# Patient Record
Sex: FEMALE | Race: BLACK OR AFRICAN AMERICAN | Employment: UNEMPLOYED | URBAN - METROPOLITAN AREA
[De-identification: names, ages, dates, MRNs, and addresses within clinical notes are randomized per-mention and may not be internally consistent; named-entity substitution may affect disease eponyms.]

---

## 2016-09-09 LAB
ANTIBODY SCREEN, EXTERNAL: NEGATIVE
CHLAMYDIA, EXTERNAL: NEGATIVE
HBSAG, EXTERNAL: NEGATIVE
HIV, EXTERNAL: NON REACTIVE
N. GONORRHEA, EXTERNAL: NEGATIVE
RPR, EXTERNAL: NON REACTIVE
RUBELLA, EXTERNAL: NORMAL
TYPE, ABO & RH, EXTERNAL: NORMAL

## 2017-04-03 LAB — GRBS, EXTERNAL: NEGATIVE

## 2017-04-15 ENCOUNTER — HOSPITAL ENCOUNTER (INPATIENT)
Age: 27
LOS: 3 days | Discharge: HOME | DRG: 560 | End: 2017-04-18
Attending: OBSTETRICS & GYNECOLOGY | Admitting: OBSTETRICS & GYNECOLOGY
Payer: MEDICAID

## 2017-04-15 PROBLEM — Z34.90 PREGNANCY: Status: ACTIVE | Noted: 2017-04-15

## 2017-04-15 LAB
BASOPHILS # BLD AUTO: 0 K/UL (ref 0–0.1)
BASOPHILS # BLD: 0 % (ref 0–1)
EOSINOPHIL # BLD: 0.2 K/UL (ref 0–0.4)
EOSINOPHIL NFR BLD: 2 % (ref 0–7)
ERYTHROCYTE [DISTWIDTH] IN BLOOD BY AUTOMATED COUNT: 14.6 % (ref 11.5–14.5)
HCT VFR BLD AUTO: 33.7 % (ref 35–47)
HGB BLD-MCNC: 11 G/DL (ref 11.5–16)
LYMPHOCYTES # BLD AUTO: 30 % (ref 12–49)
LYMPHOCYTES # BLD: 2.8 K/UL (ref 0.8–3.5)
MCH RBC QN AUTO: 26.6 PG (ref 26–34)
MCHC RBC AUTO-ENTMCNC: 32.6 G/DL (ref 30–36.5)
MCV RBC AUTO: 81.6 FL (ref 80–99)
MONOCYTES # BLD: 1 K/UL (ref 0–1)
MONOCYTES NFR BLD AUTO: 11 % (ref 5–13)
NEUTS SEG # BLD: 5.4 K/UL (ref 1.8–8)
NEUTS SEG NFR BLD AUTO: 57 % (ref 32–75)
PLATELET # BLD AUTO: 207 K/UL (ref 150–400)
RBC # BLD AUTO: 4.13 M/UL (ref 3.8–5.2)
WBC # BLD AUTO: 9.4 K/UL (ref 3.6–11)

## 2017-04-15 PROCEDURE — 65410000002 HC RM PRIVATE OB

## 2017-04-15 PROCEDURE — 74011250636 HC RX REV CODE- 250/636: Performed by: OBSTETRICS & GYNECOLOGY

## 2017-04-15 PROCEDURE — 75410000002 HC LABOR FEE PER 1 HR

## 2017-04-15 PROCEDURE — 36415 COLL VENOUS BLD VENIPUNCTURE: CPT | Performed by: OBSTETRICS & GYNECOLOGY

## 2017-04-15 PROCEDURE — 85025 COMPLETE CBC W/AUTO DIFF WBC: CPT | Performed by: OBSTETRICS & GYNECOLOGY

## 2017-04-15 RX ORDER — ONDANSETRON 2 MG/ML
4 INJECTION INTRAMUSCULAR; INTRAVENOUS
Status: DISCONTINUED | OUTPATIENT
Start: 2017-04-15 | End: 2017-04-16

## 2017-04-15 RX ORDER — NALOXONE HYDROCHLORIDE 0.4 MG/ML
0.4 INJECTION, SOLUTION INTRAMUSCULAR; INTRAVENOUS; SUBCUTANEOUS AS NEEDED
Status: DISCONTINUED | OUTPATIENT
Start: 2017-04-15 | End: 2017-04-16

## 2017-04-15 RX ORDER — OXYTOCIN IN 5 % DEXTROSE 30/500 ML
1-25 PLASTIC BAG, INJECTION (ML) INTRAVENOUS AS NEEDED
Status: DISCONTINUED | OUTPATIENT
Start: 2017-04-15 | End: 2017-04-16

## 2017-04-15 RX ORDER — SODIUM CHLORIDE, SODIUM LACTATE, POTASSIUM CHLORIDE, CALCIUM CHLORIDE 600; 310; 30; 20 MG/100ML; MG/100ML; MG/100ML; MG/100ML
125 INJECTION, SOLUTION INTRAVENOUS CONTINUOUS
Status: DISCONTINUED | OUTPATIENT
Start: 2017-04-16 | End: 2017-04-16

## 2017-04-15 RX ORDER — NALBUPHINE HYDROCHLORIDE 10 MG/ML
10 INJECTION, SOLUTION INTRAMUSCULAR; INTRAVENOUS; SUBCUTANEOUS
Status: DISCONTINUED | OUTPATIENT
Start: 2017-04-15 | End: 2017-04-16

## 2017-04-15 RX ORDER — SODIUM CHLORIDE 0.9 % (FLUSH) 0.9 %
5-10 SYRINGE (ML) INJECTION EVERY 8 HOURS
Status: DISCONTINUED | OUTPATIENT
Start: 2017-04-16 | End: 2017-04-16

## 2017-04-15 RX ORDER — SODIUM CHLORIDE 0.9 % (FLUSH) 0.9 %
5-10 SYRINGE (ML) INJECTION AS NEEDED
Status: DISCONTINUED | OUTPATIENT
Start: 2017-04-15 | End: 2017-04-16

## 2017-04-15 RX ADMIN — SODIUM CHLORIDE, SODIUM LACTATE, POTASSIUM CHLORIDE, AND CALCIUM CHLORIDE 125 ML/HR: 600; 310; 30; 20 INJECTION, SOLUTION INTRAVENOUS at 23:23

## 2017-04-15 NOTE — IP AVS SNAPSHOT
Current Discharge Medication List  
  
START taking these medications Dose & Instructions Dispensing Information Comments Morning Noon Evening Bedtime HYDROcodone-acetaminophen 5-325 mg per tablet Commonly known as:  [place] Littler Your last dose was: Your next dose is:    
   
   
 Dose:  1 Tab Take 1 Tab by mouth every four (4) hours as needed. Max Daily Amount: 6 Tabs. Quantity:  20 Tab Refills:  0  
     
   
   
   
  
 ibuprofen 800 mg tablet Commonly known as:  MOTRIN Your last dose was: Your next dose is:    
   
   
 Dose:  800 mg Take 1 Tab by mouth every eight (8) hours as needed for Pain. Quantity:  30 Tab Refills:  0 ASK your doctor about these medications Dose & Instructions Dispensing Information Comments Morning Noon Evening Bedtime  
 cyclobenzaprine 5 mg tablet Commonly known as:  FLEXERIL Your last dose was: Your next dose is:    
   
   
 Dose:  5 mg Take 1 Tab by mouth three (3) times daily as needed for Muscle Spasm(s). Quantity:  20 Tab Refills:  0  
     
   
   
   
  
 PNV No12-Iron-FA-DSS-OM-3 29 mg iron-1 mg -50 mg Cpkd Your last dose was: Your next dose is: Take  by mouth. Indications: Pregnancy Refills:  0  
     
   
   
   
  
 traMADol 50 mg tablet Commonly known as:  ULTRAM  
   
Your last dose was: Your next dose is:    
   
   
 Dose:  50 mg Take 1 Tab by mouth every six (6) hours as needed for Pain. Quantity:  15 Tab Refills:  0 Where to Get Your Medications Information on where to get these meds will be given to you by the nurse or doctor. ! Ask your nurse or doctor about these medications HYDROcodone-acetaminophen 5-325 mg per tablet  
 ibuprofen 800 mg tablet

## 2017-04-15 NOTE — IP AVS SNAPSHOT
[address] [facility] 
[phone] Patient: Kalpesh García MRN: [id number] You are allergic to the following Allergen Reactions Prednisone Other (comments) \"extreme anxiety\" Immunizations Administered for This Admission Name Date Tdap 4/17/2017 Recent Documentation Height Weight Breastfeeding? BMI OB Status Smoking Status 1.651 m 88.5 kg Unknown 32.45 kg/m2 Recent pregnancy Never Smoker Unresulted Labs Order Current Status SAMPLE TO BLOOD BANK In process Emergency Contacts Name Discharge Info Relation Home Work Mobile Marlo Alejandro CAREGIVER [3] Parent [1]   [phone] About your hospitalization You were admitted on:  April 15, 2017 You last received care in the:  [facility] You were discharged on:  April 18, 2017 Unit phone number:  [phone] Why you were hospitalized Your primary diagnosis was:  Not on File Your diagnoses also included:  Pregnancy Providers Seen During Your Hospitalizations Provider Role Specialty Primary office phone Tatiana Sagastume MD Attending Provider Obstetrics & Gynecology [phone] Your Primary Care Physician (PCP) Primary Care Physician Office Phone Office Fax Tamaragreg Les [phone] Follow-up Information Follow up With Details Comments Contact Info Jovana Roman MD   [address] [place] [address] [address] [place] [address] 
[phone] Current Discharge Medication List  
  
START taking these medications Dose & Instructions Dispensing Information Comments Morning Noon Evening Bedtime HYDROcodone-acetaminophen 5-325 mg per tablet Commonly known as:  Duane Lisandro Your last dose was: Your next dose is:    
   
   
 Dose:  1 Tab Take 1 Tab by mouth every four (4) hours as needed. Max Daily Amount: 6 Tabs. Quantity:  20 Tab Refills:  0  
     
   
   
   
  
 ibuprofen 800 mg tablet Commonly known as:  MOTRIN Your last dose was: Your next dose is:    
   
   
 Dose:  800 mg Take 1 Tab by mouth every eight (8) hours as needed for Pain. Quantity:  30 Tab Refills:  0 ASK your doctor about these medications Dose & Instructions Dispensing Information Comments Morning Noon Evening Bedtime  
 cyclobenzaprine 5 mg tablet Commonly known as:  FLEXERIL Your last dose was: Your next dose is:    
   
   
 Dose:  5 mg Take 1 Tab by mouth three (3) times daily as needed for Muscle Spasm(s). Quantity:  20 Tab Refills:  0  
     
   
   
   
  
 PNV No12-Iron-FA-DSS-OM-3 29 mg iron-1 mg -50 mg Cpkd Your last dose was: Your next dose is: Take  by mouth. Indications: Pregnancy Refills:  0  
     
   
   
   
  
 traMADol 50 mg tablet Commonly known as:  ULTRAM  
   
Your last dose was: Your next dose is:    
   
   
 Dose:  50 mg Take 1 Tab by mouth every six (6) hours as needed for Pain. Quantity:  15 Tab Refills:  0 Where to Get Your Medications Information on where to get these meds will be given to you by the nurse or doctor. ! Ask your nurse or doctor about these medications HYDROcodone-acetaminophen 5-325 mg per tablet  
 ibuprofen 800 mg tablet Discharge Instructions After Your Delivery (the Postpartum Period): Care Instructions Your Care Instructions Congratulations on the birth of your baby. Like pregnancy, the  period can be a time of excitement, elieser, and exhaustion. You may look at your wondrous little baby and feel happy. You may also be overwhelmed by your new sleep hours and new responsibilities. At first, babies often sleep during the days and are awake at night. They do not have a pattern or routine. They may make sudden gasps, jerk themselves awake, or look like they have crossed eyes. These are all normal, and they may even make you smile. In these first weeks after delivery, try to take good care of yourself. It may take 4 to 6 weeks to feel like yourself again, and possibly longer if you had a  birth. You will likely feel very tired for several weeks. Your days will be full of ups and downs, but lots of elieser as well. Follow-up care is a key part of your treatment and safety. Be sure to make and go to all appointments, and call your doctor if you are having problems. It's also a good idea to know your test results and keep a list of the medicines you take. How can you care for yourself at home? Take care of your body after delivery · Use pads instead of tampons for the bloody flow that may last as long as 2 weeks. · Ease cramps with ibuprofen (Advil, Motrin). · Ease soreness of hemorrhoids and the area between your vagina and rectum with ice compresses or witch hazel pads. · Ease constipation by drinking lots of fluid and eating high-fiber foods. Ask your doctor about over-the-counter stool softeners. · Cleanse yourself with a gentle squeeze of warm water from a bottle instead of wiping with toilet paper. · Take a sitz bath in warm water several times a day. · Wear a good nursing bra. Ease sore and swollen breasts with warm, wet washcloths. · If you are not breastfeeding, use ice rather than heat for breast soreness. · Your period may not start for several months if you are breastfeeding. You may bleed more, and longer at first, than you did before you got pregnant. · Wait until you are healed (about 4 to 6 weeks) before you have sexual intercourse. Your doctor will tell you when it is okay to have sex. · Try not to travel with your baby for 5 or 6 weeks.  If you take a long car trip, make frequent stops to walk around and stretch. Avoid exhaustion · Rest every day. Try to nap when your baby naps. · Ask another adult to be with you for a few days after delivery. · Plan for  if you have other children. · Stay flexible so you can eat at odd hours and sleep when you need to. Both you and your baby are making new schedules. · Plan small trips to get out of the house. Change can make you feel less tired. · Ask for help with housework, cooking, and shopping. Remind yourself that your job is to care for your baby. Know about help for postpartum depression · \"Baby blues\" are common for the first 1 to 2 weeks after birth. You may cry or feel sad or irritable for no reason. · Rest whenever you can. Being tired makes it harder to handle your emotions. · Go for walks with your baby. · Talk to your partner, friends, and family about your feelings. · If your symptoms last for more than a few weeks, or if you feel very depressed, ask your doctor for help. · Postpartum depression can be treated. Support groups and counseling can help. Sometimes medicine can also help. Stay healthy · Eat healthy foods so you have more energy, make good breast milk, and lose extra baby pounds. · If you breastfeed, avoid alcohol and drugs. Stay smoke-free. If you quit during pregnancy, congratulations. · Start daily exercise after 4 to 6 weeks, but rest when you feel tired. · Learn exercises to tone your belly. Do Kegel exercises to regain strength in your pelvic muscles. You can do these exercises while you stand or sit. ¨ Squeeze the same muscles you would use to stop your urine. Your belly and thighs should not move. ¨ Hold the squeeze for 3 seconds, and then relax for 3 seconds. ¨ Start with 3 seconds. Then add 1 second each week until you are able to squeeze for 10 seconds. ¨ Repeat the exercise 10 to 15 times for each session. Do three or more sessions each day. · Find a class for new mothers and new babies that has an exercise time. · If you had a  birth, give yourself a bit more time before you exercise, and be careful. When should you call for help? Call 911 anytime you think you may need emergency care. For example, call if: 
· You passed out (lost consciousness). Call your doctor now or seek immediate medical care if: 
· You have severe vaginal bleeding. This means you are passing blood clots and soaking through a pad each hour for 2 or more hours. · You are dizzy or lightheaded, or you feel like you may faint. · You have a fever. · You have new belly pain, or your pain gets worse. Watch closely for changes in your health, and be sure to contact your doctor if: 
· Your vaginal bleeding seems to be getting heavier. · You have new or worse vaginal discharge. · You feel sad, anxious, or hopeless for more than a few days. · You do not get better as expected. Where can you learn more? Go to http://diane-meagan.info/. Enter A461 in the search box to learn more about \"After Your Delivery (the Postpartum Period): Care Instructions. \" Current as of: May 30, 2016 Content Version: 11.2 © 1095-4014 [employer]. Care instructions adapted under license by [employer] (which disclaims liability or warranty for this information). If you have questions about a medical condition or this instruction, always ask your healthcare professional. [place] [address] any warranty or liability for your use of this information. [employer] Activation Thank you for requesting access to [employer]. Please follow the instructions below to securely access and download your online medical record. [employer] allows you to send messages to your doctor, view your test results, renew your prescriptions, schedule appointments, and more. How Do I Sign Up? 1. In your internet browser, go to [email or web address] 
 2. Click on the First Time User? Click Here link in the Sign In box. You will be redirect to the New Member Sign Up page. 3. Enter your [employer] Access Code exactly as it appears below. You will not need to use this code after youve completed the sign-up process. If you do not sign up before the expiration date, you must request a new code. [employer] Access Code: [email or web address] Expires: 2017  9:10 AM (This is the date your [employer] access code will ) 4. Enter the last four digits of your Social Security Number (xxxx) and Date of Birth (mm/dd/yyyy) as indicated and click Submit. You will be taken to the next sign-up page. 5. Create a [employer] ID. This will be your [employer] login ID and cannot be changed, so think of one that is secure and easy to remember. 6. Create a [employer] password. You can change your password at any time. 7. Enter your Password Reset Question and Answer. This can be used at a later time if you forget your password. 8. Enter your e-mail address. You will receive e-mail notification when new information is available in [address] 9. Click Sign Up. You can now view and download portions of your medical record. 10. Click the Download Summary menu link to download a portable copy of your medical information. Additional Information If you have questions, please visit the Frequently Asked Questions section of the [employer] website at [email or web address] Remember, [employer] is NOT to be used for urgent needs. For medical emergencies, dial 911. Discharge Orders None Introducing [facility]! Kaitlin Moreno introduces [employer] patient portal. Now you can access parts of your medical record, email your doctor's office, and request medication refills online. 1. In your internet browser, go to [email or web address] 2. Click on the First Time User? Click Here link in the Sign In box. You will see the New Member Sign Up page. 3. Enter your [employer] Access Code exactly as it appears below. You will not need to use this code after youve completed the sign-up process. If you do not sign up before the expiration date, you must request a new code. · [employer] Access Code: [email or web address] Expires: 7/17/2017  9:10 AM 
 
4. Enter the last four digits of your Social Security Number (xxxx) and Date of Birth (mm/dd/yyyy) as indicated and click Submit. You will be taken to the next sign-up page. 5. Create a [employer] ID. This will be your [employer] login ID and cannot be changed, so think of one that is secure and easy to remember. 6. Create a [employer] password. You can change your password at any time. 7. Enter your Password Reset Question and Answer. This can be used at a later time if you forget your password. 8. Enter your e-mail address. You will receive e-mail notification when new information is available in [address] 9. Click Sign Up. You can now view and download portions of your medical record. 10. Click the Download Summary menu link to download a portable copy of your medical information. If you have questions, please visit the Frequently Asked Questions section of the [employer] website. Remember, [employer] is NOT to be used for urgent needs. For medical emergencies, dial 911. Now available from your iPhone and Android! General Information Please provide this summary of care documentation to your next provider. Patient Signature:  ____________________________________________________________ Date:  ____________________________________________________________  
  
Sheri Cabral Provider Signature:  ____________________________________________________________ Date:  ____________________________________________________________

## 2017-04-15 NOTE — IP AVS SNAPSHOT
Summary of Care Report The Summary of Care report has been created to help improve care coordination. Users with access to [employer] or [address] (Web-based application) may access additional patient information including the Discharge Summary. If you are not currently a [employer] user and need more information, please call the number listed below in the [place] [address] section and ask to be connected with Medical Records. Facility Information Name Address Phone [address] [address] [phone] Patient Information Patient Name Sex  Maki Hogue ([id number]) Female 1990 Discharge Information Admitting Provider Service Area Unit Melody Navarro MD / [phone] [employer] [facility] [facility] Mother Infant / [phone] Discharge Provider Discharge Date/Time Discharge Disposition Destination (none) 2017 Morning (Pending) AHR (none) Patient Language Language ENGLISH [13] Hospital Problems as of 2017  Reviewed: 2012  2:32 PM by Kenya Aaron Noted - Resolved Last Modified POA Active Problems Pregnancy  4/15/2017 - Present 4/15/2017 by Melody Navarro MD Unknown Entered by Melody Navarro MD  
  
Non-Hospital Problems as of 2017  Reviewed: 2012  2:32 PM by Kenya Aaron Noted - Resolved Last Modified Active Problems PE (pulmonary embolism)  2012 - Present 2012 Entered by Ambrocio Barber MD  
  CHI (closed head injury)  2012 - Present 2012 Entered by Kenya Jenkins Elevated INR  2012 - Present 2012 Entered by Kenya Jenkins You are allergic to the following Allergen Reactions Prednisone Other (comments) \"extreme anxiety\" Current Discharge Medication List  
  
 START taking these medications Dose & Instructions Dispensing Information Comments HYDROcodone-acetaminophen 5-325 mg per tablet Commonly known as:  Cindy Mandril Dose:  1 Tab Take 1 Tab by mouth every four (4) hours as needed. Max Daily Amount: 6 Tabs. Quantity:  20 Tab Refills:  0  
   
 ibuprofen 800 mg tablet Commonly known as:  MOTRIN Dose:  800 mg Take 1 Tab by mouth every eight (8) hours as needed for Pain. Quantity:  30 Tab Refills:  0 ASK your doctor about these medications Dose & Instructions Dispensing Information Comments  
 cyclobenzaprine 5 mg tablet Commonly known as:  FLEXERIL Dose:  5 mg Take 1 Tab by mouth three (3) times daily as needed for Muscle Spasm(s). Quantity:  20 Tab Refills:  0  
   
 PNV No12-Iron-FA-DSS-OM-3 29 mg iron-1 mg -50 mg Cpkd Take  by mouth. Indications: Pregnancy Refills:  0  
   
 traMADol 50 mg tablet Commonly known as:  ULTRAM  
 Dose:  50 mg Take 1 Tab by mouth every six (6) hours as needed for Pain. Quantity:  15 Tab Refills:  0 Current Immunizations Name Date Influenza Vaccine Split  Deferred (Patient Refused) Tdap 2017 Follow-up Information Follow up With Details Comments Contact Info Javier Vences MD   [address] [place] [address] [address] [place] [address] 
[phone] Discharge Instructions After Your Delivery (the Postpartum Period): Care Instructions Your Care Instructions Congratulations on the birth of your baby. Like pregnancy, the  period can be a time of excitement, elieser, and exhaustion. You may look at your wondrous little baby and feel happy. You may also be overwhelmed by your new sleep hours and new responsibilities. At first, babies often sleep during the days and are awake at night. They do not have a pattern or routine.  They may make sudden gasps, jerk themselves awake, or look like they have crossed eyes. These are all normal, and they may even make you smile. In these first weeks after delivery, try to take good care of yourself. It may take 4 to 6 weeks to feel like yourself again, and possibly longer if you had a  birth. You will likely feel very tired for several weeks. Your days will be full of ups and downs, but lots of elieser as well. Follow-up care is a key part of your treatment and safety. Be sure to make and go to all appointments, and call your doctor if you are having problems. It's also a good idea to know your test results and keep a list of the medicines you take. How can you care for yourself at home? Take care of your body after delivery · Use pads instead of tampons for the bloody flow that may last as long as 2 weeks. · Ease cramps with ibuprofen (Advil, Motrin). · Ease soreness of hemorrhoids and the area between your vagina and rectum with ice compresses or witch hazel pads. · Ease constipation by drinking lots of fluid and eating high-fiber foods. Ask your doctor about over-the-counter stool softeners. · Cleanse yourself with a gentle squeeze of warm water from a bottle instead of wiping with toilet paper. · Take a sitz bath in warm water several times a day. · Wear a good nursing bra. Ease sore and swollen breasts with warm, wet washcloths. · If you are not breastfeeding, use ice rather than heat for breast soreness. · Your period may not start for several months if you are breastfeeding. You may bleed more, and longer at first, than you did before you got pregnant. · Wait until you are healed (about 4 to 6 weeks) before you have sexual intercourse. Your doctor will tell you when it is okay to have sex. · Try not to travel with your baby for 5 or 6 weeks. If you take a long car trip, make frequent stops to walk around and stretch. Avoid exhaustion · Rest every day. Try to nap when your baby naps. · Ask another adult to be with you for a few days after delivery. · Plan for  if you have other children. · Stay flexible so you can eat at odd hours and sleep when you need to. Both you and your baby are making new schedules. · Plan small trips to get out of the house. Change can make you feel less tired. · Ask for help with housework, cooking, and shopping. Remind yourself that your job is to care for your baby. Know about help for postpartum depression · \"Baby blues\" are common for the first 1 to 2 weeks after birth. You may cry or feel sad or irritable for no reason. · Rest whenever you can. Being tired makes it harder to handle your emotions. · Go for walks with your baby. · Talk to your partner, friends, and family about your feelings. · If your symptoms last for more than a few weeks, or if you feel very depressed, ask your doctor for help. · Postpartum depression can be treated. Support groups and counseling can help. Sometimes medicine can also help. Stay healthy · Eat healthy foods so you have more energy, make good breast milk, and lose extra baby pounds. · If you breastfeed, avoid alcohol and drugs. Stay smoke-free. If you quit during pregnancy, congratulations. · Start daily exercise after 4 to 6 weeks, but rest when you feel tired. · Learn exercises to tone your belly. Do Kegel exercises to regain strength in your pelvic muscles. You can do these exercises while you stand or sit. ¨ Squeeze the same muscles you would use to stop your urine. Your belly and thighs should not move. ¨ Hold the squeeze for 3 seconds, and then relax for 3 seconds. ¨ Start with 3 seconds. Then add 1 second each week until you are able to squeeze for 10 seconds. ¨ Repeat the exercise 10 to 15 times for each session. Do three or more sessions each day. · Find a class for new mothers and new babies that has an exercise time.  
· If you had a  birth, give yourself a bit more time before you exercise, and be careful. When should you call for help? Call 911 anytime you think you may need emergency care. For example, call if: 
· You passed out (lost consciousness). Call your doctor now or seek immediate medical care if: 
· You have severe vaginal bleeding. This means you are passing blood clots and soaking through a pad each hour for 2 or more hours. · You are dizzy or lightheaded, or you feel like you may faint. · You have a fever. · You have new belly pain, or your pain gets worse. Watch closely for changes in your health, and be sure to contact your doctor if: 
· Your vaginal bleeding seems to be getting heavier. · You have new or worse vaginal discharge. · You feel sad, anxious, or hopeless for more than a few days. · You do not get better as expected. Where can you learn more? Go to http://diane-meagan.info/. Enter A461 in the search box to learn more about \"After Your Delivery (the Postpartum Period): Care Instructions. \" Current as of: May 30, 2016 Content Version: 11.2 © 9469-1338 [employer]. Care instructions adapted under license by [employer] (which disclaims liability or warranty for this information). If you have questions about a medical condition or this instruction, always ask your healthcare professional. [address] any warranty or liability for your use of this information. [employer] Activation Thank you for requesting access to [employer]. Please follow the instructions below to securely access and download your online medical record. [employer] allows you to send messages to your doctor, view your test results, renew your prescriptions, schedule appointments, and more. How Do I Sign Up? 1. In your internet browser, go to [email or web address].[email or web address] 
2. Click on the First Time User? Click Here link in the Sign In box. You will be redirect to the New Member Sign Up page. 3. Enter your [employer] Access Code exactly as it appears below. You will not need to use this code after youve completed the sign-up process. If you do not sign up before the expiration date, you must request a new code. [employer] Access Code: [email or web address] Expires: 2017  9:10 AM (This is the date your [employer] access code will ) 4. Enter the last four digits of your Social Security Number (xxxx) and Date of Birth (mm/dd/yyyy) as indicated and click Submit. You will be taken to the next sign-up page. 5. Create a [employer] ID. This will be your [employer] login ID and cannot be changed, so think of one that is secure and easy to remember. 6. Create a [employer] password. You can change your password at any time. 7. Enter your Password Reset Question and Answer. This can be used at a later time if you forget your password. 8. Enter your e-mail address. You will receive e-mail notification when new information is available in [address] 9. Click Sign Up. You can now view and download portions of your medical record. 10. Click the Download Summary menu link to download a portable copy of your medical information. Additional Information If you have questions, please visit the Frequently Asked Questions section of the [employer] website at [email or web address] Remember, [employer] is NOT to be used for urgent needs. For medical emergencies, dial 911. Chart Review Routing History Recipient Method Report Sent By Hedy Paz MD  
Fax: [phone] Phone: [phone] Fax Manjinder Alba MD NOTES AUTO ROUTING REPORT Lisette Mclaughlin MD [[id number]] 2017  6:53 AM 2017

## 2017-04-16 PROCEDURE — 77030021125

## 2017-04-16 PROCEDURE — 99281 EMR DPT VST MAYX REQ PHY/QHP: CPT

## 2017-04-16 PROCEDURE — 65410000002 HC RM PRIVATE OB

## 2017-04-16 PROCEDURE — 75410000000 HC DELIVERY VAGINAL/SINGLE

## 2017-04-16 PROCEDURE — 74011250636 HC RX REV CODE- 250/636: Performed by: OBSTETRICS & GYNECOLOGY

## 2017-04-16 PROCEDURE — 75410000003 HC RECOV DEL/VAG/CSECN EA 0.5 HR

## 2017-04-16 PROCEDURE — 74011250637 HC RX REV CODE- 250/637: Performed by: OBSTETRICS & GYNECOLOGY

## 2017-04-16 PROCEDURE — 75410000002 HC LABOR FEE PER 1 HR

## 2017-04-16 RX ORDER — HYDROCODONE BITARTRATE AND ACETAMINOPHEN 5; 325 MG/1; MG/1
1 TABLET ORAL
Status: DISCONTINUED | OUTPATIENT
Start: 2017-04-16 | End: 2017-04-18 | Stop reason: HOSPADM

## 2017-04-16 RX ORDER — NALOXONE HYDROCHLORIDE 0.4 MG/ML
0.4 INJECTION, SOLUTION INTRAMUSCULAR; INTRAVENOUS; SUBCUTANEOUS AS NEEDED
Status: DISCONTINUED | OUTPATIENT
Start: 2017-04-16 | End: 2017-04-18 | Stop reason: HOSPADM

## 2017-04-16 RX ORDER — OXYTOCIN/RINGER'S LACTATE 20/1000 ML
125-500 PLASTIC BAG, INJECTION (ML) INTRAVENOUS ONCE
Status: ACTIVE | OUTPATIENT
Start: 2017-04-16 | End: 2017-04-17

## 2017-04-16 RX ORDER — IBUPROFEN 400 MG/1
800 TABLET ORAL EVERY 8 HOURS
Status: DISCONTINUED | OUTPATIENT
Start: 2017-04-16 | End: 2017-04-18 | Stop reason: HOSPADM

## 2017-04-16 RX ADMIN — Medication 16 MILLI-UNITS/MIN: at 10:00

## 2017-04-16 RX ADMIN — Medication 10 MILLI-UNITS/MIN: at 08:30

## 2017-04-16 RX ADMIN — SODIUM CHLORIDE, SODIUM LACTATE, POTASSIUM CHLORIDE, AND CALCIUM CHLORIDE 125 ML/HR: 600; 310; 30; 20 INJECTION, SOLUTION INTRAVENOUS at 09:04

## 2017-04-16 RX ADMIN — Medication 14 MILLI-UNITS/MIN: at 09:27

## 2017-04-16 RX ADMIN — Medication 6 MILLI-UNITS/MIN: at 07:05

## 2017-04-16 RX ADMIN — Medication 4 MILLI-UNITS/MIN: at 06:36

## 2017-04-16 RX ADMIN — Medication 18 MILLI-UNITS/MIN: at 10:30

## 2017-04-16 RX ADMIN — Medication 20 MILLI-UNITS/MIN: at 11:00

## 2017-04-16 RX ADMIN — Medication 8 MILLI-UNITS/MIN: at 08:00

## 2017-04-16 RX ADMIN — Medication 12 MILLI-UNITS/MIN: at 09:00

## 2017-04-16 RX ADMIN — Medication 1 MILLI-UNITS/MIN: at 06:01

## 2017-04-16 RX ADMIN — IBUPROFEN 800 MG: 400 TABLET, FILM COATED ORAL at 17:37

## 2017-04-16 NOTE — PROGRESS NOTES
Received this 27 y.o  at 40 2/7 weeks gestation . Pt presents with c/o leaking fluid since 10 pm states + fetal movement. Followed prenatally by Dr. Hermann Atkinson. Pt has hx of  x1 . Placed on EFM x2 . Consents obtained . Dr. Moise Esteban to be  notified pt is here and orders pending . 2220 clear fluid noted on pad . + Nitrazine .  Pt transferred to 3161    2310 Dr. Moise Esteban at beside e done cervix 2cm/    2320 Vertex confirmed by U/S by Dr. Moise Esteban

## 2017-04-16 NOTE — PROGRESS NOTES
TRANSFER - OUT REPORT:    Verbal report given to MARGARITA Schaefer RN(name) on Devon Dutta  being transferred to MIU(unit) for routine progression of care       Report consisted of patients Situation, Background, Assessment and   Recommendations(SBAR). Information from the following report(s) SBAR, Procedure Summary, Intake/Output, MAR and Recent Results was reviewed with the receiving nurse. Opportunity for questions and clarification was provided.

## 2017-04-16 NOTE — PROGRESS NOTES
Labor Progress Note  Patient seen, fetal heart rate and contraction pattern evaluated, patient examined. Wants to push.   Patient Vitals for the past 1 hrs:   BP Pulse SpO2   04/16/17 1410 116/70 76 -   04/16/17 1409 - - 99 %       Physical Exam:  Cervical Exam:  8 cm dilated    100% effaced    +1 station    Presenting Part: cephalic  Membranes:  clear fluid  Uterine Activity: Frequency: Every 3 minutes  Fetal Heart Rate: Variability: moderate  Accelerations: yes  Decelerations: variable    Assessment/Plan:  Reassuring fetal status, Continue plan for vaginal delivery

## 2017-04-16 NOTE — PROGRESS NOTES
Labor Progress Note  Patient seen, fetal heart rate and contraction pattern evaluated. Feeling some increasing cramping. Leaking clear fluid  Physical Exam:  116/69  Cervical Exam: not checked  Membranes:  ruptured  Uterine Activity: Frequency: q5 minutes  Fetal Heart Rate: 130 - 140,  adequate variability and reactivity  Accelerations: yes  Decelerations: none    Assessment/Plan:  Reassuring fetal status. Inadequate contraction pattern  Start augmentation--discussed with patient--she states she understands and agrees.   Regina Cornell MD

## 2017-04-16 NOTE — PROGRESS NOTES
Labor Progress Note  Patient seen, fetal heart rate and contraction pattern evaluated, patient examined. No data found.       Physical Exam:  Cervical Exam:  5 cm dilated    90% effaced    0 station    Presenting Part: cephalic  Cervical Position: anterior  Membranes:  clear fluid  Uterine Activity: Frequency: Every 3 minutes  Fetal Heart Rate: Reactive    Assessment/Plan:  Reassuring fetal status, Continue plan for vaginal delivery, SCD if in bed

## 2017-04-16 NOTE — H&P
OB History & Physical    Name: Jeanine Duarte MRN: [id number]  SSN: [id number]    YOB: 1990  Age: 27 y.o. Sex: female      Subjective:     Reason for Admission:  Pregnancy and PROM    History of Present Illness: Jeanine Duarte is a 27 y.o.  female with an estimated gestational age of 42w2d with Estimated Date of Delivery: 17. Patient complains of PROM clear fluid this evening at home about 2130. No contractions, bleeding. Baby active. She states she was treated for shingles earlier in the pregnancy--has completed her antiviral therapy, no more symptoms or lesions. Also has a history of PE due to hormonal contraception. Was on anti-coagulation for 6 months, then discontinued. No Rx during pregnancy. OB History      Para Term  AB TAB SAB Ectopic Multiple Living    2 1        1        Past Medical History:   Diagnosis Date    Other ill-defined conditions     pulmonary embolus    Other unknown and unspecified cause of morbidity or mortality     Shingles 2017    Pulmonary embolism ([facility] [address]      History reviewed. No pertinent surgical history. Social History     Occupational History    Not on file. Social History Main Topics    Smoking status: Never Smoker    Smokeless tobacco: Not on file    Alcohol use No    Drug use: No    Sexual activity: No     History reviewed. No pertinent family history. Allergies   Allergen Reactions    Prednisone Other (comments)     \"extreme anxiety\"     Prior to Admission medications    Medication Sig Start Date End Date Taking? Authorizing Provider   PNV No12-Iron-FA-DSS-OM-3 29 mg iron-1 mg -50 mg CPKD Take  by mouth. Indications: Pregnancy   Yes Historical Provider   cyclobenzaprine (FLEXERIL) 5 mg tablet Take 1 Tab by mouth three (3) times daily as needed for Muscle Spasm(s). 13   Noretta Inocencio, DO   traMADol (ULTRAM) 50 mg tablet Take 1 Tab by mouth every six (6) hours as needed for Pain.  13   Noretta Inocencio, DO Review of Systems   General: Denies fever, sweats, chills  CV: Denies CP, palpitations  Resp: Denies SOB, cough  GI: Denies nausea, vomiting, diarrhea  : Denies dysura, abnormal frequency, hematuria  Neuro: Denies HA, visual disturbance    Objective:     Vitals:    Vitals:    17 0609 17 0616 17 0636 17 0637   BP: 112/72   116/69   Pulse: 76   80   Resp:    (P) 16   Temp:    (P) 98.4 °F (36.9 °C)   SpO2:  100% 100% (P) 100%   Weight:       Height:          Temp (24hrs), Av.4 °F (36.9 °C), Min:98 °F (36.7 °C), Max:98.6 °F (37 °C)    BP  Min: 102/66  Max: 123/84     Physical Exam  General: in NAD  Back: no CVAT  Abdomen: Gravid, soft, nontender, no abnormal masses, rebound, rigidity, guarding  Fundus: soft, nontender  Cervical Exam: 2/70/-2/posterior  Uterine Activity: irregular  Membranes: gross ROM clear fluid  Fetal Heart Rate: adequate variability and reactivity    Vertex presentation confirmed by ultrasound    Labs:   Recent Results (from the past 24 hour(s))   CBC WITH AUTOMATED DIFF    Collection Time: 04/15/17 11:26 PM   Result Value Ref Range    WBC 9.4 3.6 - 11.0 K/uL    RBC 4.13 3.80 - 5.20 M/uL    HGB 11.0 (L) 11.5 - 16.0 g/dL    HCT 33.7 (L) 35.0 - 47.0 %    MCV 81.6 80.0 - 99.0 FL    MCH 26.6 26.0 - 34.0 PG    MCHC 32.6 30.0 - 36.5 g/dL    RDW 14.6 (H) 11.5 - 14.5 %    PLATELET 099 735 - 599 K/uL    NEUTROPHILS 57 32 - 75 %    LYMPHOCYTES 30 12 - 49 %    MONOCYTES 11 5 - 13 %    EOSINOPHILS 2 0 - 7 %    BASOPHILS 0 0 - 1 %    ABS. NEUTROPHILS 5.4 1.8 - 8.0 K/UL    ABS. LYMPHOCYTES 2.8 0.8 - 3.5 K/UL    ABS. MONOCYTES 1.0 0.0 - 1.0 K/UL    ABS. EOSINOPHILS 0.2 0.0 - 0.4 K/UL    ABS. BASOPHILS 0.0 0.0 - 0.1 K/UL       Patient Active Problem List   Diagnosis Code    PE (pulmonary embolism) I26.99    CHI (closed head injury) S09. 90XA    Elevated INR R79.1    Pregnancy Z33.1     Assessment and Plan:     PROM at 37 weeks with onset irregular contractions.   GBS negative  Will observe over the next few hours for increased contractions, augment if necessary.       Signed By:  Maria E Zimmer MD     April 16, 2017                 istor

## 2017-04-16 NOTE — L&D DELIVERY NOTE
Delivery Summary  Patient: Domingo Polk             Additional Delivery Comments - patient delivers a viable female infant via  over an intact perineum. No difficulty with delivery of shoulders requiring  maternal expulsive efforts only. No comps. Mother and baby doing well. Information for the patient's :  Bebeto Leos [[id number]]       Labor Events:    Labor: No   Rupture Date: 4/15/2017   Rupture Time: 10:00 PM   Rupture Type SROM   Amniotic Fluid Volume: Moderate    Amniotic Fluid Description: Clear     Induction: None       Augmentation: Oxytocin   Labor Events: None     Cervical Ripening:     None     Delivery Events:  Episiotomy: None   Laceration(s): None     Repaired: None    Number of Repair Packets:     Suture Type and Size: None     Estimated Blood Loss (ml): 200ml       Delivery Date: 2017    Delivery Time: 3:16 PM  Delivery Type: Vaginal, Spontaneous Delivery  Sex:  Female     Gestational Age: 37w3d   Delivery Clinician:  Marvin Quiñones  Living Status: Yes   Delivery Location: L&D            APGARS  One minute Five minutes Ten minutes   Skin color: 0   1        Heart rate: 2   2        Grimace: 2   2        Muscle tone: 2   2        Breathin   2        Totals: 8   9            Presentation: Vertex    Position:   Occiput Anterior  Resuscitation Method:  Suctioning-bulb; Tactile Stimulation     Meconium Stained: None      Cord Vessels: 3 Vessels      Cord Events:    Cord Blood Sent?:  No    Blood Gases Sent?:  Yes    Placenta:  Date/Time:   3:20 PM  Removal: Spontaneous      Appearance: Intact     [place] Measurements:  Birth Weight: 3.025 kg      Birth Length: 50.8 cm      Head Circumference: 33 cm      Chest Circumference: 32.5 cm     Abdominal Girth: 31 cm    Other Providers:   GENNY MARCANO;TONIA BRAY;AISHA DALEY;KADEEM GREY, Obstetrician;Primary Nurse;Primary [place] Nurse;Nursery Nurse           Cord pH:  South pH= 7.37, BE= -5.1 Art pH= 7.25,  BE= -6.4    Episiotomy: None   Laceration(s): None      Estimated Blood Loss (ml): 200    Labor Events  Method: None       Augmentation: Oxytocin   Cervical Ripening:       None        Operative Vaginal Delivery - none    Group B Strep:   Lab Results   Component Value Date/Time    GrBStrep, External NEGATIVE 2017     Information for the patient's :  Davon Norman [[id number]]   No results found for: ABORH, PCTABR, PCTDIG, BILI, ABORHEXT, ABORH    No results found for: APH, APCO2, APO2, AHCO3, ABEC, ABDC, O2ST, EPHV, PCO2V, PO2V, HCO3V, EBEV, EBDV, SITE, RSCOM

## 2017-04-16 NOTE — PROGRESS NOTES
Labor Progress Note  Patient seen, fetal heart rate and contraction pattern evaluated, patient examined.   Patient Vitals for the past 1 hrs:   BP Temp Pulse Resp   04/16/17 1307 109/67 - 90 -   04/16/17 1300 - 98.3 °F (36.8 °C) - 22       Physical Exam:  Cervical Exam:  5-6 cm dilated    100% effaced    0 station    Presenting Part: cephalic  Membranes:  clear fluid  Uterine Activity: Frequency: Every 3 minutes  Fetal Heart Rate: Reactive  Accelerations: yes  Decelerations: rare variable    Assessment/Plan:  Reassuring fetal status, Continue plan for vaginal delivery

## 2017-04-16 NOTE — PROGRESS NOTES
Labor Progress Note  Patient seen, fetal heart rate and contraction pattern evaluated, patient examined. No data found.       Physical Exam:  Cervical Exam:  6-7 cm dilated    100% effaced    0 station    Presenting Part: cephalic  Membranes:  clear fluid  Uterine Activity: Frequency: Every 3 minutes  Fetal Heart Rate: Accelerations: yes with scalp stim    Assessment/Plan:  Reassuring fetal status, Continue plan for vaginal delivery

## 2017-04-17 PROCEDURE — 74011250637 HC RX REV CODE- 250/637: Performed by: OBSTETRICS & GYNECOLOGY

## 2017-04-17 PROCEDURE — 90715 TDAP VACCINE 7 YRS/> IM: CPT | Performed by: OBSTETRICS & GYNECOLOGY

## 2017-04-17 PROCEDURE — 65410000002 HC RM PRIVATE OB

## 2017-04-17 PROCEDURE — 74011250636 HC RX REV CODE- 250/636: Performed by: OBSTETRICS & GYNECOLOGY

## 2017-04-17 RX ORDER — DOCUSATE SODIUM 100 MG/1
100 CAPSULE, LIQUID FILLED ORAL
Status: DISCONTINUED | OUTPATIENT
Start: 2017-04-17 | End: 2017-04-18 | Stop reason: HOSPADM

## 2017-04-17 RX ORDER — SIMETHICONE 80 MG
80 TABLET,CHEWABLE ORAL
Status: DISCONTINUED | OUTPATIENT
Start: 2017-04-17 | End: 2017-04-18 | Stop reason: HOSPADM

## 2017-04-17 RX ADMIN — IBUPROFEN 800 MG: 400 TABLET, FILM COATED ORAL at 15:58

## 2017-04-17 RX ADMIN — IBUPROFEN 800 MG: 400 TABLET, FILM COATED ORAL at 08:21

## 2017-04-17 RX ADMIN — DOCUSATE SODIUM 100 MG: 100 CAPSULE, LIQUID FILLED ORAL at 20:22

## 2017-04-17 RX ADMIN — TETANUS TOXOID, REDUCED DIPHTHERIA TOXOID AND ACELLULAR PERTUSSIS VACCINE, ADSORBED 0.5 ML: 5; 2.5; 8; 8; 2.5 SUSPENSION INTRAMUSCULAR at 08:22

## 2017-04-17 RX ADMIN — SIMETHICONE 80 MG: 80 TABLET, CHEWABLE ORAL at 20:23

## 2017-04-17 RX ADMIN — IBUPROFEN 800 MG: 400 TABLET, FILM COATED ORAL at 00:38

## 2017-04-17 NOTE — PROGRESS NOTES
Post-Partum Day Number 1 Progress Note    [facility]     Information for the patient's :  Pamela Cifuentes [[id number]]   Vaginal, Spontaneous Delivery   Patient doing well without significant complaint. Voiding without difficulty, normal lochia. Vitals:    Visit Vitals    BP 99/70 (BP 1 Location: Right arm, BP Patient Position: At rest)    Pulse 69    Temp 97.6 °F (36.4 °C)    Resp 18    Ht 5' 5\" (1.651 m)    Wt 88.5 kg (195 lb)    SpO2 93%    Breastfeeding Unknown    BMI 32.45 kg/m2     Temp (24hrs), Av.8 °F (36.6 °C), Min:97.6 °F (36.4 °C), Max:98.3 °F (36.8 °C)          Exam:  Patient without distress. Abdomen soft, fundus firm, nontender                Perineum with normal lochia noted. Lower extremities are negative for swelling, cords or tenderness.     Labs:     Lab Results   Component Value Date/Time    WBC 9.4 04/15/2017 11:26 PM    WBC 4.2 2013 10:41 AM    WBC 5.3 2012 08:56 PM    WBC 4.0 2012 10:20 AM    WBC 3.5 2012 10:25 AM    WBC 6.2 2012 05:45 PM    WBC 3.9 2012 08:27 AM    WBC 4.6 2012 01:40 PM    WBC 5.4 2012 04:45 PM    WBC 5.4 2012 07:06 PM    WBC 7.2 2012 04:15 PM    WBC 4.5 2012 01:15 PM    WBC 6.2 01/15/2012 02:01 AM    WBC 5.6 2011 12:45 PM    WBC 5.8 2011 05:30 PM    HGB 11.0 04/15/2017 11:26 PM    HGB 12.8 2013 10:41 AM    HGB 10.7 2012 08:56 PM    HGB 11.5 2012 10:20 AM    HGB 12.0 2012 10:25 AM    HGB 11.5 2012 05:45 PM    HGB 12.0 2012 08:27 AM    HGB 11.6 2012 01:40 PM    HGB 11.5 2012 04:45 PM    HGB 13.1 2012 07:06 PM    HGB 12.9 2012 04:15 PM    HGB 12.5 2012 01:15 PM    HGB 12.1 01/15/2012 02:01 AM    HGB 12.3 2011 12:45 PM    HGB 12.3 2011 05:30 PM    HCT 33.7 04/15/2017 11:26 PM    HCT 39.5 2013 10:41 AM    HCT 33.1 2012 08:56 PM    HCT 35.2 2012 10:20 AM HCT 36.1 05/02/2012 10:25 AM    HCT 35.0 04/23/2012 05:45 PM    HCT 35.7 04/20/2012 08:27 AM    HCT 35.8 03/18/2012 01:40 PM    HCT 34.4 03/16/2012 04:45 PM    HCT 39.6 02/23/2012 07:06 PM    HCT 38.5 02/16/2012 04:15 PM    HCT 38.3 02/09/2012 01:15 PM    HCT 36.3 01/15/2012 02:01 AM    HCT 37.2 12/17/2011 12:45 PM    HCT 37.1 12/11/2011 05:30 PM    PLATELET 921 74/47/0451 11:26 PM    PLATELET 079 57/91/2209 10:41 AM    PLATELET 580 83/77/5027 08:56 PM    PLATELET 144 17/68/4553 10:20 AM    PLATELET 061 11/75/4738 10:25 AM    PLATELET 662 81/78/3082 05:45 PM    PLATELET 500 95/59/7759 08:27 AM    PLATELET 162 89/97/6827 01:40 PM    PLATELET 461 51/22/5580 04:45 PM    PLATELET 596 87/17/1728 07:06 PM    PLATELET 137 35/45/9913 04:15 PM    PLATELET 315 34/52/8850 01:15 PM    PLATELET 061 16/99/5786 02:01 AM    PLATELET 308 43/59/2182 12:45 PM    PLATELET 715 53/01/5680 05:30 PM       No results found for this or any previous visit (from the past 24 hour(s)). Assessment: Doing well, post partum day 1      Plan:  1. Continue routine postpartum and perineal care as well as maternal education.

## 2017-04-17 NOTE — PROGRESS NOTES
Bedside and Verbal shift change report given to MARY Cuello (oncoming nurse) by Naomi Mccray RN (offgoing nurse). Report included the following information SBAR, Intake/Output and MAR.

## 2017-04-17 NOTE — ADT AUTH CERT NOTES
Patient Demographics        Patient Name [address] Sex  Address Phone       Rj Ramos [id number] Female 1990 [address] [facility] [phone] [facility]  [phone] (Mobile) *Preferred*           CSN:       [id number]           Admit Date: Admit Time Room Bed       Apr 15, 2017 10:21 PM 3309 [[id number]] 01 [[id number]]           Attending Providers        Provider Pager From To       Kallie Webb MD  04/15/17       ADM 4/15 DEL        Delivery Summary - Baby     Delivery Date: 2017   Delivery Time: 3:16 PM   Delivery Type: Vaginal, Spontaneous Delivery  Sex: female   Gestational Age: 44w3d     [place] Measurements:  Birth Weight: 3.025 kg    Birth Length: 20\"          Delivery Clinician: Marvin Quiñones  Living?:   Delivery Location: L&D

## 2017-04-18 VITALS
TEMPERATURE: 98 F | BODY MASS INDEX: 32.49 KG/M2 | OXYGEN SATURATION: 93 % | DIASTOLIC BLOOD PRESSURE: 72 MMHG | RESPIRATION RATE: 16 BRPM | WEIGHT: 195 LBS | SYSTOLIC BLOOD PRESSURE: 111 MMHG | HEIGHT: 65 IN | HEART RATE: 76 BPM

## 2017-04-18 PROCEDURE — 74011250637 HC RX REV CODE- 250/637: Performed by: OBSTETRICS & GYNECOLOGY

## 2017-04-18 RX ORDER — HYDROCODONE BITARTRATE AND ACETAMINOPHEN 5; 325 MG/1; MG/1
1 TABLET ORAL
Qty: 20 TAB | Refills: 0 | Status: SHIPPED | OUTPATIENT
Start: 2017-04-18

## 2017-04-18 RX ORDER — IBUPROFEN 800 MG/1
800 TABLET ORAL
Qty: 30 TAB | Refills: 0 | Status: SHIPPED | OUTPATIENT
Start: 2017-04-18

## 2017-04-18 RX ADMIN — IBUPROFEN 800 MG: 400 TABLET, FILM COATED ORAL at 05:26

## 2017-04-18 NOTE — PROGRESS NOTES
Post-Partum Day Number 2 Progress Note    [facility]     Information for the patient's :  Daniela Novak [[id number]]   Vaginal, Spontaneous Delivery   Patient doing well without significant complaint. Voiding without difficulty, normal lochia. Vitals:    Visit Vitals    /56    Pulse 66    Temp 97.6 °F (36.4 °C)    Resp 18    Ht 5' 5\" (1.651 m)    Wt 88.5 kg (195 lb)    SpO2 93%    Breastfeeding Unknown    BMI 32.45 kg/m2     Temp (24hrs), Av °F (36.7 °C), Min:97.6 °F (36.4 °C), Max:98.2 °F (36.8 °C)          Exam:         Patient without distress. Abdomen soft, fundus firm, nontender                Lower extremities are negative for swelling, cords or tenderness.     Labs:     Lab Results   Component Value Date/Time    WBC 9.4 04/15/2017 11:26 PM    WBC 4.2 2013 10:41 AM    WBC 5.3 2012 08:56 PM    WBC 4.0 2012 10:20 AM    WBC 3.5 2012 10:25 AM    WBC 6.2 2012 05:45 PM    WBC 3.9 2012 08:27 AM    WBC 4.6 2012 01:40 PM    WBC 5.4 2012 04:45 PM    WBC 5.4 2012 07:06 PM    WBC 7.2 2012 04:15 PM    WBC 4.5 2012 01:15 PM    WBC 6.2 01/15/2012 02:01 AM    WBC 5.6 2011 12:45 PM    WBC 5.8 2011 05:30 PM    HGB 11.0 04/15/2017 11:26 PM    HGB 12.8 2013 10:41 AM    HGB 10.7 2012 08:56 PM    HGB 11.5 2012 10:20 AM    HGB 12.0 2012 10:25 AM    HGB 11.5 2012 05:45 PM    HGB 12.0 2012 08:27 AM    HGB 11.6 2012 01:40 PM    HGB 11.5 2012 04:45 PM    HGB 13.1 2012 07:06 PM    HGB 12.9 2012 04:15 PM    HGB 12.5 2012 01:15 PM    HGB 12.1 01/15/2012 02:01 AM    HGB 12.3 2011 12:45 PM    HGB 12.3 2011 05:30 PM    HCT 33.7 04/15/2017 11:26 PM    HCT 39.5 2013 10:41 AM    HCT 33.1 2012 08:56 PM    HCT 35.2 2012 10:20 AM    HCT 36.1 2012 10:25 AM    HCT 35.0 2012 05:45 PM    HCT 35.7 2012 08:27 AM    HCT 35.8 03/18/2012 01:40 PM    HCT 34.4 03/16/2012 04:45 PM    HCT 39.6 02/23/2012 07:06 PM    HCT 38.5 02/16/2012 04:15 PM    HCT 38.3 02/09/2012 01:15 PM    HCT 36.3 01/15/2012 02:01 AM    HCT 37.2 12/17/2011 12:45 PM    HCT 37.1 12/11/2011 05:30 PM    PLATELET 490 28/46/5559 11:26 PM    PLATELET 507 37/90/2478 10:41 AM    PLATELET 054 49/19/0099 08:56 PM    PLATELET 872 74/40/9231 10:20 AM    PLATELET 284 05/32/0349 10:25 AM    PLATELET 674 31/53/3820 05:45 PM    PLATELET 888 32/96/5418 08:27 AM    PLATELET 486 64/82/0335 01:40 PM    PLATELET 265 00/43/6046 04:45 PM    PLATELET 658 33/72/2120 07:06 PM    PLATELET 387 18/64/2681 04:15 PM    PLATELET 603 04/46/0646 01:15 PM    PLATELET 135 56/71/8903 02:01 AM    PLATELET 454 41/72/0835 12:45 PM    PLATELET 074 99/87/6672 05:30 PM       No results found for this or any previous visit (from the past 24 hour(s)). Assessment: Doing well, post partum day 2      Plan:   1. Discharge home today  2. Follow up in office in 6 weeks with Peña Crockett MD  3. Post partum activity advised, diet as tolerated  4.  Discharge Medications: ibuprofen, percocet and medications prior to admission

## 2017-04-18 NOTE — DISCHARGE INSTRUCTIONS
After Your Delivery (the Postpartum Period): Care Instructions  Your Care Instructions  Congratulations on the birth of your baby. Like pregnancy, the  period can be a time of excitement, elieser, and exhaustion. You may look at your wondrous little baby and feel happy. You may also be overwhelmed by your new sleep hours and new responsibilities. At first, babies often sleep during the days and are awake at night. They do not have a pattern or routine. They may make sudden gasps, jerk themselves awake, or look like they have crossed eyes. These are all normal, and they may even make you smile. In these first weeks after delivery, try to take good care of yourself. It may take 4 to 6 weeks to feel like yourself again, and possibly longer if you had a  birth. You will likely feel very tired for several weeks. Your days will be full of ups and downs, but lots of elieser as well. Follow-up care is a key part of your treatment and safety. Be sure to make and go to all appointments, and call your doctor if you are having problems. It's also a good idea to know your test results and keep a list of the medicines you take. How can you care for yourself at home? Take care of your body after delivery  · Use pads instead of tampons for the bloody flow that may last as long as 2 weeks. · Ease cramps with ibuprofen (Advil, Motrin). · Ease soreness of hemorrhoids and the area between your vagina and rectum with ice compresses or witch hazel pads. · Ease constipation by drinking lots of fluid and eating high-fiber foods. Ask your doctor about over-the-counter stool softeners. · Cleanse yourself with a gentle squeeze of warm water from a bottle instead of wiping with toilet paper. · Take a sitz bath in warm water several times a day. · Wear a good nursing bra. Ease sore and swollen breasts with warm, wet washcloths. · If you are not breastfeeding, use ice rather than heat for breast soreness.   · Your period may not start for several months if you are breastfeeding. You may bleed more, and longer at first, than you did before you got pregnant. · Wait until you are healed (about 4 to 6 weeks) before you have sexual intercourse. Your doctor will tell you when it is okay to have sex. · Try not to travel with your baby for 5 or 6 weeks. If you take a long car trip, make frequent stops to walk around and stretch. Avoid exhaustion  · Rest every day. Try to nap when your baby naps. · Ask another adult to be with you for a few days after delivery. · Plan for  if you have other children. · Stay flexible so you can eat at odd hours and sleep when you need to. Both you and your baby are making new schedules. · Plan small trips to get out of the house. Change can make you feel less tired. · Ask for help with housework, cooking, and shopping. Remind yourself that your job is to care for your baby. Know about help for postpartum depression  · \"Baby blues\" are common for the first 1 to 2 weeks after birth. You may cry or feel sad or irritable for no reason. · Rest whenever you can. Being tired makes it harder to handle your emotions. · Go for walks with your baby. · Talk to your partner, friends, and family about your feelings. · If your symptoms last for more than a few weeks, or if you feel very depressed, ask your doctor for help. · Postpartum depression can be treated. Support groups and counseling can help. Sometimes medicine can also help. Stay healthy  · Eat healthy foods so you have more energy, make good breast milk, and lose extra baby pounds. · If you breastfeed, avoid alcohol and drugs. Stay smoke-free. If you quit during pregnancy, congratulations. · Start daily exercise after 4 to 6 weeks, but rest when you feel tired. · Learn exercises to tone your belly. Do Kegel exercises to regain strength in your pelvic muscles. You can do these exercises while you stand or sit.   ¨ Squeeze the same muscles you would use to stop your urine. Your belly and thighs should not move. ¨ Hold the squeeze for 3 seconds, and then relax for 3 seconds. ¨ Start with 3 seconds. Then add 1 second each week until you are able to squeeze for 10 seconds. ¨ Repeat the exercise 10 to 15 times for each session. Do three or more sessions each day. · Find a class for new mothers and new babies that has an exercise time. · If you had a  birth, give yourself a bit more time before you exercise, and be careful. When should you call for help? Call 911 anytime you think you may need emergency care. For example, call if:  · You passed out (lost consciousness). Call your doctor now or seek immediate medical care if:  · You have severe vaginal bleeding. This means you are passing blood clots and soaking through a pad each hour for 2 or more hours. · You are dizzy or lightheaded, or you feel like you may faint. · You have a fever. · You have new belly pain, or your pain gets worse. Watch closely for changes in your health, and be sure to contact your doctor if:  · Your vaginal bleeding seems to be getting heavier. · You have new or worse vaginal discharge. · You feel sad, anxious, or hopeless for more than a few days. · You do not get better as expected. Where can you learn more? Go to http://diane-meagan.info/. Enter A461 in the search box to learn more about \"After Your Delivery (the Postpartum Period): Care Instructions. \"  Current as of: May 30, 2016  Content Version: 11.2  © 9908-5630 "[employer]". Care instructions adapted under license by [employer] (which disclaims liability or warranty for this information). If you have questions about a medical condition or this instruction, always ask your healthcare professional. [address] any warranty or liability for your use of this information.   MyChart Activation    Thank you for requesting access to [employer]. Please follow the instructions below to securely access and download your online medical record. [facility] [employer] allows you to send messages to your doctor, view your test results, renew your prescriptions, schedule appointments, and more. How Do I Sign Up? 1. In your internet browser, go to [email or web address]  2. Click on the First Time User? Click Here link in the Sign In box. You will be redirect to the New Member Sign Up page. 3. Enter your [employer] Access Code exactly as it appears below. You will not need to use this code after youve completed the sign-up process. If you do not sign up before the expiration date, you must request a new code. [employer] Access Code: [email or web address]  Expires: 2017  9:10 AM (This is the date your [employer] access code will )    4. Enter the last four digits of your Social Security Number (xxxx) and Date of Birth (mm/dd/yyyy) as indicated and click Submit. You will be taken to the next sign-up page. 5. Create a [employer] ID. This will be your [employer] login ID and cannot be changed, so think of one that is secure and easy to remember. 6. Create a [employer] password. You can change your password at any time. 7. Enter your Password Reset Question and Answer. This can be used at a later time if you forget your password. 8. Enter your e-mail address. You will receive e-mail notification when new information is available in [address] 9. Click Sign Up. You can now view and download portions of your medical record. 10. Click the Download Summary menu link to download a portable copy of your medical information. Additional Information    If you have questions, please visit the Frequently Asked Questions section of the [employer] website at [email or web address] Remember, [employer] is NOT to be used for urgent needs. For medical emergencies, dial 911.

## 2017-04-20 ENCOUNTER — HOSPITAL ENCOUNTER (EMERGENCY)
Age: 27
Discharge: HOME | End: 2017-04-20
Attending: EMERGENCY MEDICINE
Payer: MEDICAID

## 2017-04-20 ENCOUNTER — APPOINTMENT (OUTPATIENT)
Dept: ULTRASOUND IMAGING | Age: 27
End: 2017-04-20
Attending: EMERGENCY MEDICINE
Payer: MEDICAID

## 2017-04-20 VITALS
HEART RATE: 85 BPM | SYSTOLIC BLOOD PRESSURE: 112 MMHG | RESPIRATION RATE: 16 BRPM | DIASTOLIC BLOOD PRESSURE: 69 MMHG | OXYGEN SATURATION: 98 % | HEIGHT: 65 IN | BODY MASS INDEX: 31.22 KG/M2 | WEIGHT: 187.39 LBS | TEMPERATURE: 98.7 F

## 2017-04-20 DIAGNOSIS — R60.0 LEG EDEMA, LEFT: Primary | ICD-10-CM

## 2017-04-20 DIAGNOSIS — L03.116 CELLULITIS OF LEFT LOWER EXTREMITY: ICD-10-CM

## 2017-04-20 PROCEDURE — 93971 EXTREMITY STUDY: CPT

## 2017-04-20 PROCEDURE — 99284 EMERGENCY DEPT VISIT MOD MDM: CPT

## 2017-04-20 RX ORDER — SULFAMETHOXAZOLE AND TRIMETHOPRIM 800; 160 MG/1; MG/1
1 TABLET ORAL 2 TIMES DAILY
Qty: 20 TAB | Refills: 0 | Status: SHIPPED | OUTPATIENT
Start: 2017-04-20 | End: 2017-04-30

## 2017-04-20 NOTE — ED NOTES
Assumed care of pt from triage. Pt alert and accompanied by mother. Pt c/o swelling in left leg that has occurred over last 2 days. Pt denies pain but reports a \"tight feeling\". Pt states \"I had a PE in 2012\".     Pt placed on monior x2, call bell within reach

## 2017-04-21 NOTE — DISCHARGE INSTRUCTIONS
Leg and Ankle Edema: Care Instructions  Your Care Instructions  Swelling in the legs, ankles, and feet is called edema. It is common after you sit or stand for a while. Long plane flights or car rides often cause swelling in the legs and feet. You may also have swelling if you have to stand for long periods of time at your job. Problems with the veins in the legs (varicose veins) and changes in hormones can also cause swelling. Sometimes the swelling in the ankles and feet is caused by a more serious problem, such as heart failure, infection, blood clots, or liver or kidney disease. Follow-up care is a key part of your treatment and safety. Be sure to make and go to all appointments, and call your doctor if you are having problems. Its also a good idea to know your test results and keep a list of the medicines you take. How can you care for yourself at home? · If your doctor gave you medicine, take it as prescribed. Call your doctor if you think you are having a problem with your medicine. · Whenever you are resting, raise your legs up. Try to keep the swollen area higher than the level of your heart. · Take breaks from standing or sitting in one position. ¨ Walk around to increase the blood flow in your lower legs. ¨ Move your feet and ankles often while you stand, or tighten and relax your leg muscles. · Wear support stockings. Put them on in the morning, before swelling gets worse. · Eat a balanced diet. Lose weight if you need to. · Limit the amount of salt (sodium) in your diet. Salt holds fluid in the body and may increase swelling. When should you call for help? Call 911 anytime you think you may need emergency care. For example, call if:  · You have symptoms of a blood clot in your lung (called a pulmonary embolism). These may include:  ¨ Sudden chest pain. ¨ Trouble breathing. ¨ Coughing up blood.   Call your doctor now or seek immediate medical care if:  · You have signs of a blood clot, such as:  ¨ Pain in your calf, back of the knee, thigh, or groin. ¨ Redness and swelling in your leg or groin. · You have symptoms of infection, such as:  ¨ Increased pain, swelling, warmth, or redness. ¨ Red streaks or pus. ¨ A fever. Watch closely for changes in your health, and be sure to contact your doctor if:  · Your swelling is getting worse. · You have new or worsening pain in your legs. · You do not get better as expected. Where can you learn more? Go to http://diane-meagan.info/. Enter M900 in the search box to learn more about \"Leg and Ankle Edema: Care Instructions. \"  Current as of: May 27, 2016  Content Version: 11.2  © 9886-6908 [employer]. Care instructions adapted under license by [employer] (which disclaims liability or warranty for this information). If you have questions about a medical condition or this instruction, always ask your healthcare professional. [place] [address] any warranty or liability for your use of this information. Cellulitis: Care Instructions  Your Care Instructions    Cellulitis is a skin infection. It often occurs after a break in the skin from a scrape, cut, bite, or puncture, or after a rash. The doctor has checked you carefully, but problems can develop later. If you notice any problems or new symptoms, get medical treatment right away. Follow-up care is a key part of your treatment and safety. Be sure to make and go to all appointments, and call your doctor if you are having problems. It's also a good idea to know your test results and keep a list of the medicines you take. How can you care for yourself at home? · Take your antibiotics as directed. Do not stop taking them just because you feel better. You need to take the full course of antibiotics. · Prop up the infected area on pillows to reduce pain and swelling.  Try to keep the area above the level of your heart as often as you can.  · If your doctor told you how to care for your wound, follow your doctor's instructions. If you did not get instructions, follow this general advice:  ¨ Wash the wound with clean water 2 times a day. Don't use hydrogen peroxide or alcohol, which can slow healing. ¨ You may cover the wound with a thin layer of petroleum jelly, such as Vaseline, and a nonstick bandage. ¨ Apply more petroleum jelly and replace the bandage as needed. · Be safe with medicines. Take pain medicines exactly as directed. ¨ If the doctor gave you a prescription medicine for pain, take it as prescribed. ¨ If you are not taking a prescription pain medicine, ask your doctor if you can take an over-the-counter medicine. To prevent cellulitis in the future  · Try to prevent cuts, scrapes, or other injuries to your skin. Cellulitis most often occurs where there is a break in the skin. · If you get a scrape, cut, mild burn, or bite, wash the wound with clean water as soon as you can to help avoid infection. Don't use hydrogen peroxide or alcohol, which can slow healing. · If you have swelling in your legs (edema), support stockings and good skin care may help prevent leg sores and cellulitis. · Take care of your feet, especially if you have diabetes or other conditions that increase the risk of infection. Wear shoes and socks. Do not go barefoot. If you have athlete's foot or other skin problems on your feet, talk to your doctor about how to treat them. When should you call for help? Call your doctor now or seek immediate medical care if:  · You have signs that your infection is getting worse, such as:  ¨ Increased pain, swelling, warmth, or redness. ¨ Red streaks leading from the area. ¨ Pus draining from the area. ¨ A fever. · You get a rash. Watch closely for changes in your health, and be sure to contact your doctor if:  · You are not getting better after 1 day (24 hours). · You do not get better as expected.   Where can you learn more? Go to http://diane-meagan.info/. Ash Lazo in the search box to learn more about \"Cellulitis: Care Instructions. \"  Current as of: October 13, 2016  Content Version: 11.2  © 1512-7382 [employer]. Care instructions adapted under license by [employer] (which disclaims liability or warranty for this information). If you have questions about a medical condition or this instruction, always ask your healthcare professional. [place] [address] any warranty or liability for your use of this information.

## 2017-04-21 NOTE — ED NOTES
Dr. Duke Linton has reviewed discharge instructions with the patient. The patient verbalized understanding. Pt wheeled out of ED and assisted by RN into private vehicle driven by her mother.

## 2017-10-20 NOTE — ED PROVIDER NOTES
HPI Comments: Zenia Giordano is a 27 y.o. Female, with a pertinent hx of PE in 2012 (no current AC therapy), who presents ambulatory to the ED c/o swelling to the left lower leg, onset one hour PTA. She also reports associated 8/10 \"tightness\" in the left lower leg. She denies any injury to the left leg, but reports that she is currently four days post-partum after vaginal delivery. Pt denies recent long travel, and she specifically denies fevers, chills, CP, SOB, and abdominal pain. Social hx: - Tobacco use, - EtOH use, - Illicit drug use  FHx: HTN, DM, CAD   PCP: Alberta Roach MD, MD    There are no other complaints, changes or physical findings at this time. The history is provided by the patient. No  was used. Past Medical History:   Diagnosis Date    Other ill-defined conditions     pulmonary embolus    Other unknown and unspecified cause of morbidity or mortality     Shingles 4/2017    Pulmonary embolism ([facility] [address]        History reviewed. No pertinent surgical history. Family History:   Problem Relation Age of Onset    Hypertension Mother     Diabetes Mother     Hypertension Father     Diabetes Father     Hypertension Maternal Aunt     Hypertension Maternal Grandmother        Social History     Social History    Marital status: SINGLE     Spouse name: N/A    Number of children: N/A    Years of education: N/A     Occupational History    Not on file. Social History Main Topics    Smoking status: Never Smoker    Smokeless tobacco: Not on file    Alcohol use No    Drug use: No    Sexual activity: No     Other Topics Concern    Not on file     Social History Narrative         ALLERGIES: Prednisone    Review of Systems   Constitutional: Negative for chills and fever. HENT: Negative for congestion. Eyes: Negative. Respiratory: Negative for shortness of breath. Cardiovascular: Positive for leg swelling (left). Negative for chest pain. Gastrointestinal: Negative for abdominal pain. Endocrine: Negative for heat intolerance. Genitourinary: Negative for dysuria. Musculoskeletal: Positive for myalgias (left leg). Skin: Negative for rash. Allergic/Immunologic: Negative for immunocompromised state. Neurological: Negative for dizziness. Hematological: Does not bruise/bleed easily. Psychiatric/Behavioral: Negative. All other systems reviewed and are negative. Patient Vitals for the past 12 hrs:   Temp Pulse Resp BP SpO2   04/20/17 2107 - 85 16 112/69 98 %   04/20/17 2045 - 88 16 117/74 98 %   04/20/17 2008 - 82 16 115/73 98 %   04/20/17 2005 - - - - 98 %   04/20/17 1848 98.7 °F (37.1 °C) - 20 140/76 99 %        Physical Exam   Constitutional: She is oriented to person, place, and time. She appears well-developed and well-nourished. No distress. HENT:   Head: Normocephalic and atraumatic. Eyes: EOM are normal.   Neck: Normal range of motion. Neck supple. Cardiovascular: Normal rate, regular rhythm and normal heart sounds. 2+ distal pulses   Pulmonary/Chest: Effort normal and breath sounds normal. No respiratory distress. Abdominal: Soft. Bowel sounds are normal. She exhibits no mass. There is no tenderness. Musculoskeletal: Normal range of motion. LLE: slight increase in temperature L thigh, tenderness in L calf, trace edema LLE   Neurological: She is alert and oriented to person, place, and time. Coordination normal.   Skin: Skin is warm and dry. Psychiatric: She has a normal mood and affect. Nursing note and vitals reviewed.        MDM  Number of Diagnoses or Management Options  Cellulitis of left lower extremity:   Leg edema, left:   Diagnosis management comments: DDx: DVT, musculoskeletal pain, Baker's cyst       Amount and/or Complexity of Data Reviewed  Tests in the radiology section of CPT®: ordered and reviewed  Review and summarize past medical records: yes    Patient Progress  Patient progress: stable    ED Course       Procedures       Progress Note:  8:50 PM  Reviewed all results with the patient, and all of her questions have been answered. Will prepare for discharge. Written by Jose Browne ED Scribe, as dictated by Ryanne Zhou MD.       IMPRESSION:  1. Leg edema, left    2. Cellulitis of left lower extremity        PLAN:  1. Current Discharge Medication List      START taking these medications    Details   trimethoprim-sulfamethoxazole (BACTRIM DS) 160-800 mg per tablet Take 1 Tab by mouth two (2) times a day for 10 days. Qty: 20 Tab, Refills: 0           2. Follow-up Information     Follow up With Details Comments Contact Info    Abdulaziz Reinoso MD In 4 days As needed [address]  [address] [place] [address]      [facility] EMERGENCY DEPT  If symptoms worsen [address] [place] [address]  [phone]        3. Return to ED if worse     DISCHARGE NOTE  8:53 PM  The patient has been re-evaluated and is ready for discharge. Reviewed available results with patient. Counseled patient on diagnosis and care plan. Patient has expressed understanding, and all questions have been answered. Patient agrees with plan and agrees to follow up as recommended, or return to the ED if their symptoms worsen. Discharge instructions have been provided and explained to the patient, along with reasons to return to the ED. ATTESTATION:  This note is prepared by Maribeth Brian and Gisela Marroquin. Richard, acting as Scribes for Ryanne Zhou MD.    Ryanne Zhou MD: The scribe's documentation has been prepared under my direction and personally reviewed by me in its entirety. I confirm that the note above accurately reflects all work, treatment, procedures, and medical decision making performed by me. alert

## 2021-09-17 NOTE — ROUTINE PROCESS
Bedside shift change report given to MAYUR Blair RN by MARY Robbins RN . Report given with SBAR.
Bedside shift change report given to MAYUR Hawkins RN (oncoming nurse) by IRAIS Carrion RN (offgoing nurse). Report included the following information SBAR.
Discharge education and instructions completed with patient. Prescriptions given and explained, patient verbalizes understanding of all information. Patient discharged to home with infant, assisted to the hospital exit by hospital volunteers.
No

## 2022-03-18 PROBLEM — Z34.90 PREGNANCY: Status: ACTIVE | Noted: 2017-04-15

## 2023-10-25 LAB — HBA1C MFR BLD HPLC: 5.7 %

## 2023-11-21 ENCOUNTER — TELEPHONE (OUTPATIENT)
Age: 33
End: 2023-11-21

## 2023-11-22 NOTE — TELEPHONE ENCOUNTER
Pt is returning call to schedule apt with Dr. Kraft. She has been scheduled for 01/10/2024 at 3:40 PM, however pt would like to know if there is an apt sooner she would take it.     Pt ph # [phone]

## 2023-12-05 NOTE — PROGRESS NOTES
CARDIOLOGY OFFICE NOTE    Shreyas Sands MD, [facility] [address], [facility]  Phone [phone]; Fax [phone]  Mobile [phone]   Voice Mail [phone]    Primary care: Dusty Escalante MD       ATTENTION:   This medical record was transcribed using an electronic medical records/speech recognition system. Although proofread, it may and can contain electronic, spelling and other errors. Corrections may be executed at a later time. Please feel free to contact us for any clarifications as needed. Will Dior is a 26 y.o. female with  referred for palpitations and was noted to be hyperthyroid with extremely low TSH. Cardiac risk factors: obesity (BMI >= 30 kg/m2)    HISTORY OF PRESENTING ILLNESS    Ms./Mr. Will Dior  26 y.o. is a very pleasant and smart lady who comes in today with complaints of dizziness. Some labs were obtained on her and her TSH is extremely low. She does have occasional palpitations but is not associated with the dizziness. She notes sometimes it comes on with her. Her EKG today is a normal sinus rhythm but on the high side. She is post to see an endocrinologist but will be over a week from now. ACTIVE PROBLEM LIST     Patient Active Problem List    Diagnosis Date Noted    Pregnancy 04/15/2017    CHI (closed head injury) 04/02/2012    Elevated INR 04/02/2012           PAST MEDICAL HISTORY     History reviewed. No pertinent past medical history. PAST SURGICAL HISTORY     History reviewed. No pertinent surgical history. ALLERGIES     Allergies   Allergen Reactions    Prednisone           FAMILY HISTORY     History reviewed. No pertinent family history.  negative for cardiac disease       SOCIAL HISTORY     Social History     Socioeconomic History    Marital status: Single     Spouse name: None    Number of children: None    Years of education: None    Highest education level: None

## 2023-12-06 ENCOUNTER — OFFICE VISIT (OUTPATIENT)
Age: 33
End: 2023-12-06
Payer: COMMERCIAL

## 2023-12-06 ENCOUNTER — TELEPHONE (OUTPATIENT)
Age: 33
End: 2023-12-06

## 2023-12-06 VITALS
BODY MASS INDEX: 32.43 KG/M2 | HEIGHT: 66 IN | SYSTOLIC BLOOD PRESSURE: 120 MMHG | HEART RATE: 110 BPM | OXYGEN SATURATION: 95 % | WEIGHT: 201.8 LBS | DIASTOLIC BLOOD PRESSURE: 70 MMHG

## 2023-12-06 DIAGNOSIS — R42 DIZZINESS: Primary | ICD-10-CM

## 2023-12-06 PROCEDURE — 1036F TOBACCO NON-USER: CPT | Performed by: SPECIALIST

## 2023-12-06 PROCEDURE — G8484 FLU IMMUNIZE NO ADMIN: HCPCS | Performed by: SPECIALIST

## 2023-12-06 PROCEDURE — G8417 CALC BMI ABV UP PARAM F/U: HCPCS | Performed by: SPECIALIST

## 2023-12-06 PROCEDURE — 99204 OFFICE O/P NEW MOD 45 MIN: CPT | Performed by: SPECIALIST

## 2023-12-06 PROCEDURE — G8427 DOCREV CUR MEDS BY ELIG CLIN: HCPCS | Performed by: SPECIALIST

## 2023-12-06 PROCEDURE — 93000 ELECTROCARDIOGRAM COMPLETE: CPT | Performed by: SPECIALIST

## 2023-12-06 RX ORDER — PROPRANOLOL HYDROCHLORIDE 10 MG/1
10 TABLET ORAL 2 TIMES DAILY
Qty: 60 TABLET | Refills: 4 | Status: SHIPPED | OUTPATIENT
Start: 2023-12-06

## 2023-12-06 NOTE — PATIENT INSTRUCTIONS

## 2023-12-06 NOTE — PROGRESS NOTES
Hudson Munoz is a 26 y.o. female    had concerns including New Patient (orthos) and Dizziness. Vitals:    23 1254 23 1305 23 1308   BP: 118/74 120/70 120/70   Site: Left Upper Arm Right Upper Arm Right Upper Arm   Position: Sitting Supine Standing   Pulse: (!) 104 (!) 104 (!) 110   SpO2: 98% 98% 95%   Weight: 91.5 kg (201 lb 12.8 oz)     Height: 1.676 m (5' 6\")          Chest pain fluttering     SOB No    Dizziness thinks it maybe hormonal     Swelling No    Refills No    Covid Vaccinated yes      1. Have you been to the ER, urgent care clinic since your last visit? Hospitalized since your last visit? no    2. Have you seen or consulted any other health care providers outside of the [address] [place] [address] since your last visit? Include any pap smears or colon screening. No    NAME Hudson Munoz         1990      MRN    [id number]      LAST OFFICE APPOINTMENT: Visit date not found     DIAGNOSIS    ICD-10-CM    1. Dizziness  R42 EKG 12 Lead          HOME MEDICATION  No current outpatient medications on file. No current facility-administered medications for this visit. VITAL SIGNS  Wt Readings from Last 3 Encounters:   23 91.5 kg (201 lb 12.8 oz)     BP Readings from Last 3 Encounters:   23 120/70     Pulse Readings from Last 3 Encounters:   23 (!) 110         SPECIALTY COMMENTS  1.  Lipids  10/26/23- , HDL 49, , TG 70

## 2023-12-07 NOTE — TELEPHONE ENCOUNTER
Enrolled with [employer] - Ordered and being shipped to patient's home address on file. ETA within 5-7 Business Days.

## 2023-12-18 PROBLEM — E05.90 HYPERTHYROIDISM: Status: ACTIVE | Noted: 2023-12-18

## 2023-12-22 ENCOUNTER — NURSE ONLY (OUTPATIENT)
Age: 33
End: 2023-12-22

## 2023-12-22 DIAGNOSIS — E05.90 HYPERTHYROIDISM: ICD-10-CM

## 2023-12-22 LAB
T4 FREE SERPL-MCNC: 1.8 NG/DL (ref 0.8–1.5)
TSH SERPL DL<=0.05 MIU/L-ACNC: <0.01 UIU/ML (ref 0.36–3.74)

## 2023-12-23 LAB
T3 SERPL-MCNC: 252 NG/DL (ref 71–180)
TSI ACT/NOR SER: 5.19 IU/L (ref 0–0.55)

## 2024-01-09 ENCOUNTER — OFFICE VISIT (OUTPATIENT)
Age: 34
End: 2024-01-09
Payer: COMMERCIAL

## 2024-01-09 VITALS
HEART RATE: 86 BPM | OXYGEN SATURATION: 100 % | WEIGHT: 202 LBS | BODY MASS INDEX: 32.47 KG/M2 | HEIGHT: 66 IN | DIASTOLIC BLOOD PRESSURE: 75 MMHG | RESPIRATION RATE: 16 BRPM | TEMPERATURE: 97 F | SYSTOLIC BLOOD PRESSURE: 117 MMHG

## 2024-01-09 DIAGNOSIS — E05.90 HYPERTHYROIDISM: Primary | ICD-10-CM

## 2024-01-09 PROCEDURE — G8417 CALC BMI ABV UP PARAM F/U: HCPCS | Performed by: INTERNAL MEDICINE

## 2024-01-09 PROCEDURE — 1036F TOBACCO NON-USER: CPT | Performed by: INTERNAL MEDICINE

## 2024-01-09 PROCEDURE — 99214 OFFICE O/P EST MOD 30 MIN: CPT | Performed by: INTERNAL MEDICINE

## 2024-01-09 PROCEDURE — G8427 DOCREV CUR MEDS BY ELIG CLIN: HCPCS | Performed by: INTERNAL MEDICINE

## 2024-01-09 PROCEDURE — G8484 FLU IMMUNIZE NO ADMIN: HCPCS | Performed by: INTERNAL MEDICINE

## 2024-01-09 RX ORDER — METHIMAZOLE 5 MG/1
5 TABLET ORAL DAILY
Qty: 90 TABLET | Refills: 0 | Status: SHIPPED | OUTPATIENT
Start: 2024-01-09

## 2024-01-09 NOTE — PROGRESS NOTES
Hansa Johnson is a 33 y.o. female here for   Chief Complaint   Patient presents with    Thyroid Problem       1. Have you been to the ER, urgent care clinic since your last visit?  Hospitalized since your last visit? -no    2. Have you seen or consulted any other health care providers outside of the [facility] since your last visit?  Include any pap smears or colon screening.-no

## 2024-01-09 NOTE — PROGRESS NOTES
Follow up     PCP: Keren Thomson MD    Chief Complaint   Patient presents with    Thyroid Problem     HPI:  Hansa Johnson is a 33 y.o. female with  has a past medical history of Costochondritis, Dyslipidemia, History of deviated nasal septum, and Vitamin D deficiency. who is seen in consultation at the request of eKren Thomson MD for evaluation of hyperthyroidism.     Today -   Labs reviewed  Feeling well   Palpitations resolved   Not on any medications   Thyroid US reviewed   BRIEF ROS   Gen: no fevers/chills  CV: no chest pain  Resp: no shortness of breath, cough   GI: no nausea, emesis, abdominal pain  Neuro: no confusion     Initial visit  notes 12/2023 -   Recent eval for dizziness revealed low tsh  Prompted endocrine eval   Labs 10/2023.  AST mildly elevated at 38.  AST normal.  Serum creatinine normal.  eGFR 116.  TSH suppressed.  Hemoglobin A1c 5.7%.  History of PE in 2012.  repeat Labs on 10/27/2023 showed TSH of 0.018 with a free T4 of 2.28 with a reference range of 0.82-1.77.  White blood cell count normal.    Anti-thyroid medications: none   Beta blockade: prescribed by cardiologist, did not start taking   No prior thyroid imaging   No weight loss   Heat intolerance/ diaphoresis: No  Occasional palpitations - has holter, seeing cardiology   No chest pain   Fatigue: No   +anxious - started few months ago   Mood: insomnia  Diarrhea/ frequent bowel movements: No   Menstrual cycle: regular   Skin/hair/nail changes: No   Eye symptoms: No   Neck swelling: No   No Dysphagia, voice changes, SOB  Grandmother with overactive thyroid   Exposure to XRT to head and neck or ionizing radiation in childhood:  Recent steroids, immune-therapy/biotin/herbal supplements:  Recent vaccines/URI symptoms  Pregnancy plans: No   Not on birth control, reports no chance she is pregnant  Menses started today     LABS/STUDIES:   No results found for: \"OFL0SUFP\"  No results found for: \"LABA1C\", \"[id number]\",

## 2024-01-09 NOTE — ASSESSMENT & PLAN NOTE
Hyperthyroid on labs   No prior tx   Start methimazole 5 mg daily  Reports no plans for pregnancy, aware to alert me if pregnant or planning as MMI should not be used pre-conception or first trimester  +TSI Ab   Clinically asx   Thyroid US reviewed - no nodules, findings c/w graves     Regarding thionamide: Patient cautioned regarding the risks of agranulocytosis/neutropenia or liver toxicity.  Was told that if development of fever, sore throat or sores in mouth, immediately contact me or PCP to check white blood cell count.  If development of nausea, vomiting or scleral icterus, immediately contact me or PCP to check liver function.  Patient informed that if the side effects develop and the drug is discontinued the problem generally recovers within a few days without additional therapy but if the drug is continued can develop serious, even life-threatening infection or liver failure.

## 2024-01-30 NOTE — PROGRESS NOTES
CARDIOLOGY OFFICE NOTE    [address] [address], [place] [address]  Phone [phone]; Fax [phone]    Primary Cardiologist: Shreyas Kraft MD, [facility]  Last Office Visit: 12/6/23    Primary care: Keren Thomson MD     Hansa Johnson is a 33 y.o. female with  referred for palpitations and was noted to be hyperthyroid with extremely low TSH.          Cardiac risk factors: obesity (BMI >= 30 kg/m2)    HISTORY OF PRESENTING ILLNESS    Ms./. Hansa Johnson  33 y.o. presents today for follow-up of palpitations. Was recently diagnosed with Graves Disease. Treated with medication and feeling much better. Denies further palpitations. Denies chest pain, shortness of breath, no further dizziness. Has lost weight. Wakes up gasping sometimes, plans to see sleep medicine soon.        ACTIVE PROBLEM LIST     Patient Active Problem List    Diagnosis Date Noted    Hyperthyroidism 12/18/2023    Pregnancy 04/15/2017    CHI (closed head injury) 04/02/2012    Elevated INR 04/02/2012           PAST MEDICAL HISTORY     Past Medical History:   Diagnosis Date    Costochondritis     Dyslipidemia     History of deviated nasal septum     Vitamin D deficiency            PAST SURGICAL HISTORY     History reviewed. No pertinent surgical history.       ALLERGIES     Allergies   Allergen Reactions    Prednisone           FAMILY HISTORY     Family History   Problem Relation Age of Onset    Diabetes Mother     Hypertension Father     Diabetes Father     Hypertension Maternal Aunt     Diabetes Maternal Grandmother     Heart Disease Maternal Grandmother     Diabetes Maternal Grandfather     negative for cardiac disease       SOCIAL HISTORY     Social History     Socioeconomic History    Marital status: Single     Spouse name: None    Number of children: None    Years of education: None    Highest education level: None   Tobacco Use    Smoking status: Never    Smokeless tobacco: Never   Substance and

## 2024-01-31 ENCOUNTER — OFFICE VISIT (OUTPATIENT)
Age: 34
End: 2024-01-31
Payer: COMMERCIAL

## 2024-01-31 VITALS
OXYGEN SATURATION: 98 % | WEIGHT: 195 LBS | SYSTOLIC BLOOD PRESSURE: 110 MMHG | BODY MASS INDEX: 31.34 KG/M2 | DIASTOLIC BLOOD PRESSURE: 70 MMHG | HEART RATE: 98 BPM | HEIGHT: 66 IN

## 2024-01-31 DIAGNOSIS — E05.90 HYPERTHYROIDISM: ICD-10-CM

## 2024-01-31 DIAGNOSIS — R00.2 PALPITATION: Primary | ICD-10-CM

## 2024-01-31 PROCEDURE — G8427 DOCREV CUR MEDS BY ELIG CLIN: HCPCS

## 2024-01-31 PROCEDURE — G8484 FLU IMMUNIZE NO ADMIN: HCPCS

## 2024-01-31 PROCEDURE — G8417 CALC BMI ABV UP PARAM F/U: HCPCS

## 2024-01-31 PROCEDURE — 99214 OFFICE O/P EST MOD 30 MIN: CPT

## 2024-01-31 PROCEDURE — 1036F TOBACCO NON-USER: CPT

## 2024-01-31 NOTE — PROGRESS NOTES
Chief Complaint   Patient presents with    Follow-up     1 mo      Vitals:    01/31/24 1313   BP: 110/70   Site: Right Upper Arm   Position: Sitting   Pulse: 98   SpO2: 98%   Weight: 88.5 kg (195 lb)   Height: 1.676 m (5' 6\")         Chest pain denied     Recent hospital stays denied     Refills denied

## 2024-02-16 ENCOUNTER — NURSE ONLY (OUTPATIENT)
Age: 34
End: 2024-02-16

## 2024-02-16 ENCOUNTER — OFFICE VISIT (OUTPATIENT)
Age: 34
End: 2024-02-16
Payer: COMMERCIAL

## 2024-02-16 VITALS
SYSTOLIC BLOOD PRESSURE: 117 MMHG | HEIGHT: 66 IN | DIASTOLIC BLOOD PRESSURE: 80 MMHG | WEIGHT: 197.2 LBS | BODY MASS INDEX: 31.69 KG/M2 | HEART RATE: 70 BPM | OXYGEN SATURATION: 100 %

## 2024-02-16 DIAGNOSIS — E05.90 HYPERTHYROIDISM: ICD-10-CM

## 2024-02-16 DIAGNOSIS — G47.33 OSA (OBSTRUCTIVE SLEEP APNEA): Primary | ICD-10-CM

## 2024-02-16 LAB
ALBUMIN SERPL-MCNC: 3.6 G/DL (ref 3.5–5)
ALBUMIN/GLOB SERPL: 1.1 (ref 1.1–2.2)
ALP SERPL-CCNC: 91 U/L (ref 45–117)
ALT SERPL-CCNC: 24 U/L (ref 12–78)
AST SERPL-CCNC: 16 U/L (ref 15–37)
BILIRUB DIRECT SERPL-MCNC: <0.1 MG/DL (ref 0–0.2)
BILIRUB SERPL-MCNC: 0.2 MG/DL (ref 0.2–1)
GLOBULIN SER CALC-MCNC: 3.4 G/DL (ref 2–4)
PROT SERPL-MCNC: 7 G/DL (ref 6.4–8.2)
T4 FREE SERPL-MCNC: 1.1 NG/DL (ref 0.8–1.5)
TSH SERPL DL<=0.05 MIU/L-ACNC: <0.01 UIU/ML (ref 0.36–3.74)

## 2024-02-16 PROCEDURE — 1036F TOBACCO NON-USER: CPT | Performed by: SPECIALIST

## 2024-02-16 PROCEDURE — G8417 CALC BMI ABV UP PARAM F/U: HCPCS | Performed by: SPECIALIST

## 2024-02-16 PROCEDURE — 99203 OFFICE O/P NEW LOW 30 MIN: CPT | Performed by: SPECIALIST

## 2024-02-16 PROCEDURE — G8427 DOCREV CUR MEDS BY ELIG CLIN: HCPCS | Performed by: SPECIALIST

## 2024-02-16 PROCEDURE — G8484 FLU IMMUNIZE NO ADMIN: HCPCS | Performed by: SPECIALIST

## 2024-02-16 NOTE — PROGRESS NOTES
[facility]  [facility]  [address] [address]  Dept: [phone]           [address]  [place] [address]  Tel.  [phone]  Fax. [phone] [address]  [place] [address]  Tel.  [phone]  Fax. [phone] [address] [place] [address]  Tel.  [phone]  Fax. [phone]       Chief Complaint       Chief Complaint   Patient presents with    Sleep Problem     NP refd by Shreyas Kraft MD for dizziness; palpitations; gasping for air        HPI      Hansa Johnson is 33 y.o. female seen for evaluation of a sleep disorder.     The patient reports she has experienced episodes of \"gasping for air\".  Will retire between 9-9: 30 PM and awakens at 5 AM.  Does feel rested on awakening.  Notes that she is often tired by 1: 30 PM.  Somewhat better during the rest of the day.  Has been told of potential apnea.    Denies vivid dreams or nightmares, sleep talking or sleepwalking, bruxism or nocturnal incontinence, abnormal arm or leg movements, hypnagogue hallucinations, sleep paralysis or cataplexy.    Does not fall asleep inappropriately during the day.  [place] Sleepiness Scale: 0    The patient has not undergone diagnostic testing for the current problems.             2/13/2024     7:35 AM   Sleep Medicine   Sitting and reading 0   Watching TV 0   Sitting, inactive in a public place (e.g. a theatre or a meeting) 0   As a passenger in a car for an hour without a break 0   Lying down to rest in the afternoon when circumstances permit 0   Sitting and talking to someone 0   Sitting quietly after a lunch without alcohol 0   In a car, while stopped for a few minutes in traffic 0   [place] Sleepiness Score 0   Neck circumference (Inches) 15        Allergies   Allergen Reactions    Prednisone          Current Outpatient Medications:     methIMAzole (TAPAZOLE) 5 MG tablet, Take 1 tablet

## 2024-02-17 LAB
25(OH)D3 SERPL-MCNC: 17.3 NG/ML (ref 30–100)
BASOPHILS # BLD: 0 K/UL (ref 0–0.1)
BASOPHILS NFR BLD: 1 % (ref 0–1)
DIFFERENTIAL METHOD BLD: ABNORMAL
EOSINOPHIL # BLD: 0.3 K/UL (ref 0–0.4)
EOSINOPHIL NFR BLD: 5 % (ref 0–7)
ERYTHROCYTE [DISTWIDTH] IN BLOOD BY AUTOMATED COUNT: 14.9 % (ref 11.5–14.5)
HCT VFR BLD AUTO: 39.5 % (ref 35–47)
HGB BLD-MCNC: 12 G/DL (ref 11.5–16)
IMM GRANULOCYTES # BLD AUTO: 0 K/UL (ref 0–0.04)
IMM GRANULOCYTES NFR BLD AUTO: 0 % (ref 0–0.5)
LYMPHOCYTES # BLD: 3.2 K/UL (ref 0.8–3.5)
LYMPHOCYTES NFR BLD: 51 % (ref 12–49)
MCH RBC QN AUTO: 24.5 PG (ref 26–34)
MCHC RBC AUTO-ENTMCNC: 30.4 G/DL (ref 30–36.5)
MCV RBC AUTO: 80.6 FL (ref 80–99)
MONOCYTES # BLD: 0.4 K/UL (ref 0–1)
MONOCYTES NFR BLD: 6 % (ref 5–13)
NEUTS SEG # BLD: 2.3 K/UL (ref 1.8–8)
NEUTS SEG NFR BLD: 37 % (ref 32–75)
NRBC # BLD: 0 K/UL (ref 0–0.01)
NRBC BLD-RTO: 0 PER 100 WBC
PLATELET # BLD AUTO: 298 K/UL (ref 150–400)
PMV BLD AUTO: 10.6 FL (ref 8.9–12.9)
RBC # BLD AUTO: 4.9 M/UL (ref 3.8–5.2)
WBC # BLD AUTO: 6.2 K/UL (ref 3.6–11)

## 2024-02-18 LAB — T3 SERPL-MCNC: 118 NG/DL (ref 71–180)

## 2024-02-19 DIAGNOSIS — E05.90 HYPERTHYROIDISM: Primary | ICD-10-CM

## 2024-02-29 ENCOUNTER — PROCEDURE VISIT (OUTPATIENT)
Age: 34
End: 2024-02-29

## 2024-02-29 ENCOUNTER — HOSPITAL ENCOUNTER (OUTPATIENT)
Facility: HOSPITAL | Age: 34
Discharge: HOME | End: 2024-03-03
Payer: COMMERCIAL

## 2024-02-29 DIAGNOSIS — G47.33 OSA (OBSTRUCTIVE SLEEP APNEA): ICD-10-CM

## 2024-02-29 DIAGNOSIS — G47.33 OSA (OBSTRUCTIVE SLEEP APNEA): Primary | ICD-10-CM

## 2024-02-29 PROCEDURE — 95800 SLP STDY UNATTENDED: CPT | Performed by: SPECIALIST

## 2024-02-29 NOTE — PROGRESS NOTES
[address]  [place] [address]  Tel.  [phone]  Fax. [phone] [address]  [place] [address]  Tel.  [phone]  Fax. [phone] [address] [place] [address]  Tel.  [phone]  Fax. [phone]       S>Hansa Johnson is a 33 y.o. female seen today to receive a home sleep testing unit (HST).    Patient was educated on proper hookup and operation of the HST.  Instruction forms and documentation were reviewed and signed.  The patient demonstrated good understanding of the HST.    O>    There were no vitals taken for this visit.      A>  No diagnosis found.      P>  General information regarding operations and maintenance of the device was provided.  She was provided information on sleep apnea including coresponding risk factors and the importance of proper treatment.   Follow-up appointment was made to return the HST. She will be contacted once the results have been reviewed.  She was asked to contact our office for any problems regarding her home sleep test study.   WP [id number]

## 2024-03-05 ENCOUNTER — TELEPHONE (OUTPATIENT)
Age: 34
End: 2024-03-05

## 2024-03-12 ENCOUNTER — TELEPHONE (OUTPATIENT)
Age: 34
End: 2024-03-12

## 2024-03-12 DIAGNOSIS — E05.90 HYPERTHYROIDISM: ICD-10-CM

## 2024-03-12 RX ORDER — METHIMAZOLE 5 MG/1
7.5 TABLET ORAL DAILY
Qty: 90 TABLET | Refills: 0 | Status: SHIPPED | OUTPATIENT
Start: 2024-03-12

## 2024-03-12 NOTE — TELEPHONE ENCOUNTER
----- Message from Parris Baker LPN sent at 3/12/2024  8:46 AM EDT -----  Regarding: FW: Can I take allergy medicine?   Contact: [phone]    ----- Message -----  From: Hansa Johnson  Sent: 3/11/2024   6:33 PM EDT  To: #  Subject: Can I take allergy medicine?                     Lani Arroyo,      I wanted to know if I’d be able to take allergy meds while taking the Merhimazole. I’ve been feeling under the weather and think it’s allergies and hopefully not a cold.    Also, since starting the 1.5 doses of the methimazole, I’m low on meds and will definitely be out within the upcoming week. Please advise

## 2024-04-04 ENCOUNTER — TELEPHONE (OUTPATIENT)
Age: 34
End: 2024-04-04

## 2024-04-04 NOTE — TELEPHONE ENCOUNTER
WatchPAT HSAT performed for potential sleep disordered breathing.  8 hours 25 minutes recorded in which 6 hours 19 minutes asleep.  18.3% of sleep in REM.  All sleep stages observed.  Patient slept supine and right lateral.    AHI 1.5/h (4% desaturation definition).  AHI 6.2/h (3% desaturation definition).  Minimal SpO2 88%.  Limited snoring.    Impression: Mild sleep disordered breathing (3% desaturation definition)    Recommendation:   Weight reduction measures.    Results were discussed with the patient.  She will contact the office if symptoms become more pronounced.

## 2024-04-05 ENCOUNTER — NURSE ONLY (OUTPATIENT)
Age: 34
End: 2024-04-05

## 2024-04-05 DIAGNOSIS — E05.90 HYPERTHYROIDISM: ICD-10-CM

## 2024-04-05 LAB
T4 FREE SERPL-MCNC: 0.7 NG/DL (ref 0.8–1.5)
TSH SERPL DL<=0.05 MIU/L-ACNC: 0.65 UIU/ML (ref 0.36–3.74)

## 2024-04-12 ENCOUNTER — OFFICE VISIT (OUTPATIENT)
Age: 34
End: 2024-04-12
Payer: COMMERCIAL

## 2024-04-12 VITALS
BODY MASS INDEX: 33.2 KG/M2 | SYSTOLIC BLOOD PRESSURE: 120 MMHG | DIASTOLIC BLOOD PRESSURE: 86 MMHG | WEIGHT: 206.6 LBS | HEART RATE: 86 BPM | HEIGHT: 66 IN | OXYGEN SATURATION: 98 %

## 2024-04-12 DIAGNOSIS — E05.90 HYPERTHYROIDISM: Primary | ICD-10-CM

## 2024-04-12 PROCEDURE — G8417 CALC BMI ABV UP PARAM F/U: HCPCS | Performed by: INTERNAL MEDICINE

## 2024-04-12 PROCEDURE — 99214 OFFICE O/P EST MOD 30 MIN: CPT | Performed by: INTERNAL MEDICINE

## 2024-04-12 PROCEDURE — 1036F TOBACCO NON-USER: CPT | Performed by: INTERNAL MEDICINE

## 2024-04-12 PROCEDURE — G8427 DOCREV CUR MEDS BY ELIG CLIN: HCPCS | Performed by: INTERNAL MEDICINE

## 2024-04-12 RX ORDER — METHIMAZOLE 5 MG/1
5 TABLET ORAL DAILY
Qty: 90 TABLET | Refills: 0 | Status: SHIPPED | OUTPATIENT
Start: 2024-04-12

## 2024-04-12 NOTE — PROGRESS NOTES
Hansa Johnson is a 33 y.o. female here for   Chief Complaint   Patient presents with    Hyperthyroidism       1. Have you been to the ER, urgent care clinic since your last visit?  Hospitalized since your last visit? -NO    2. Have you seen or consulted any other health care providers outside of the [facility] since your last visit?  Include any pap smears or colon screening.-NO      
Plan:  Hyperthyroid on labs   No prior tx   Start methimazole 5 mg daily  Reports no plans for pregnancy, aware to alert me if pregnant or planning as MMI should not be used pre-conception or first trimester  +TSI Ab   Clinically asx   Thyroid US reviewed - no nodules, findings c/w graves     Regarding thionamide: Patient cautioned regarding the risks of agranulocytosis/neutropenia or liver toxicity.  Was told that if development of fever, sore throat or sores in mouth, immediately contact me or PCP to check white blood cell count.  If development of nausea, vomiting or scleral icterus, immediately contact me or PCP to check liver function.  Patient informed that if the side effects develop and the drug is discontinued the problem generally recovers within a few days without additional therapy but if the drug is continued can develop serious, even life-threatening infection or liver failure.  Orders:  -     TSH + Free T4 Panel; Future  -     T3; Future  -     methIMAzole (TAPAZOLE) 5 MG tablet; Take 1 tablet by mouth daily, Disp-90 tablet, R-0Normal    Ok with portal for results.     Return in about 3 months (around 7/12/2024).    Thank you for allowing me to participate in the care of this patient.

## 2024-04-26 ENCOUNTER — NURSE ONLY (OUTPATIENT)
Age: 34
End: 2024-04-26

## 2024-04-26 DIAGNOSIS — E05.90 HYPERTHYROIDISM: ICD-10-CM

## 2024-05-01 LAB
T3 SERPL-MCNC: 108 NG/DL (ref 71–180)
T4 FREE SERPL-MCNC: 0.8 NG/DL (ref 0.8–1.5)
TSH SERPL DL<=0.05 MIU/L-ACNC: 1.44 UIU/ML (ref 0.36–3.74)

## 2024-05-02 ENCOUNTER — PATIENT MESSAGE (OUTPATIENT)
Age: 34
End: 2024-05-02

## 2024-05-02 DIAGNOSIS — E05.90 HYPERTHYROIDISM: ICD-10-CM

## 2024-05-02 NOTE — TELEPHONE ENCOUNTER
From: Hansa Johnson  To: Dr. Tatianna Arroyo  Sent: 5/2/2024 9:56 AM EDT  Subject: 90 day supply or Methimazole    Hi Dr Arroyo!     I am so happy my levels have improved. I wanted to let you know that I had not gotten the 90 day supply of the Methimazole from [facility]. They hadn’t alerted me of any prescriptions filled aside from the 45 day refill they’d already filled by 4/12. I picked that one up and have been using that one. I’m sure it won’t last until my next appointment though.

## 2024-05-03 DIAGNOSIS — E05.90 HYPERTHYROIDISM: Primary | ICD-10-CM

## 2024-05-03 RX ORDER — METHIMAZOLE 5 MG/1
2.5 TABLET ORAL DAILY
Qty: 45 TABLET | Refills: 0 | Status: SHIPPED | OUTPATIENT
Start: 2024-05-03

## 2024-05-03 RX ORDER — METHIMAZOLE 5 MG/1
5 TABLET ORAL DAILY
Qty: 90 TABLET | Refills: 3 | Status: SHIPPED | OUTPATIENT
Start: 2024-05-03 | End: 2024-05-03 | Stop reason: SDUPTHER

## 2024-06-06 ENCOUNTER — OFFICE VISIT (OUTPATIENT)
Age: 34
End: 2024-06-06
Payer: COMMERCIAL

## 2024-06-06 VITALS
HEIGHT: 66 IN | RESPIRATION RATE: 16 BRPM | DIASTOLIC BLOOD PRESSURE: 78 MMHG | TEMPERATURE: 97.8 F | HEART RATE: 86 BPM | SYSTOLIC BLOOD PRESSURE: 118 MMHG | OXYGEN SATURATION: 98 % | BODY MASS INDEX: 33.75 KG/M2 | WEIGHT: 210 LBS

## 2024-06-06 DIAGNOSIS — E05.90 HYPERTHYROIDISM: ICD-10-CM

## 2024-06-06 DIAGNOSIS — R42 DIZZINESS: Primary | ICD-10-CM

## 2024-06-06 DIAGNOSIS — G47.33 OSA (OBSTRUCTIVE SLEEP APNEA): ICD-10-CM

## 2024-06-06 PROCEDURE — 1036F TOBACCO NON-USER: CPT | Performed by: PSYCHIATRY & NEUROLOGY

## 2024-06-06 PROCEDURE — 99205 OFFICE O/P NEW HI 60 MIN: CPT | Performed by: PSYCHIATRY & NEUROLOGY

## 2024-06-06 PROCEDURE — G8427 DOCREV CUR MEDS BY ELIG CLIN: HCPCS | Performed by: PSYCHIATRY & NEUROLOGY

## 2024-06-06 PROCEDURE — G8417 CALC BMI ABV UP PARAM F/U: HCPCS | Performed by: PSYCHIATRY & NEUROLOGY

## 2024-06-06 ASSESSMENT — PATIENT HEALTH QUESTIONNAIRE - PHQ9
SUM OF ALL RESPONSES TO PHQ QUESTIONS 1-9: 0
2. FEELING DOWN, DEPRESSED OR HOPELESS: NOT AT ALL
SUM OF ALL RESPONSES TO PHQ QUESTIONS 1-9: 0
SUM OF ALL RESPONSES TO PHQ9 QUESTIONS 1 & 2: 0
SUM OF ALL RESPONSES TO PHQ QUESTIONS 1-9: 0
1. LITTLE INTEREST OR PLEASURE IN DOING THINGS: NOT AT ALL
SUM OF ALL RESPONSES TO PHQ QUESTIONS 1-9: 0

## 2024-06-06 NOTE — PROGRESS NOTES
evaluation of her dizziness.    Condition started last 10/2023.  Patient would have periods of dizziness, feeling off balance mainly when standing from sitting.  Patient also noted issues with palpitations.  Patient saw her PCP who did some laboratory workup and referred her to see neurology and cardiology.  Laboratory workup done 10/27/2023 revealed low TSH at 0.018, increased free T4 at 2.28 and increased T3 at 260.    Patient was seen by Dr. Kraft (cardiology) last 12/6/2023.  Note mentions patient complaining of dizziness and lab workup showing extremely low TSH.  Complains of occasional palpitations that is not associated with dizziness.  EKG revealed normal sinus rhythm but on the higher side.  Note mentions lipid panel done 10/26/2023 revealed elevated LDL at 110 and hemoglobin A1c of 5.7.  Echocardiogram and event monitor was ordered.  Patient prescribed propranolol 10 mg twice a day.  Event monitor for 6 days did not reveal any significant pathology.  Sinus tachycardia.  Echocardiogram done 12/18/2023 revealed EF of 65%.  Mild elevation in RSVP and mild TR.    Patient was seen by endocrinology last 12/18/2023.  Note mentions labs done revealed mildly elevated AST at 38.  Repeat laboratory workup was ordered and ultrasound of the neck was also ordered.  Labs done 12/22/2023 revealed decreased TSH at less than 0.01, increased total T3 at 252 and increased TSI at 5.19.  Neck ultrasound done 1/3/2024 revealed diffusely enlarged heterogeneous thyroid gland suggestive of thyroiditis.  Patient was seen on follow-up 1/9/2024 and patient was started on methimazole 5 mg daily.  Repeat labs done 2/16/2024 revealed decreased vitamin D at 17.3, normal T3, decreased TSH at less than 0.01, decreased free T4 at 0.7, normal TSH and hepatic function panel, unremarkable CBC.  Advised to increase methimazole to 7.5 mg daily.  Advised to start vitamin D supplementation.  Patient was last seen by endocrine 4/12/2022 and note

## 2024-07-02 ENCOUNTER — TELEPHONE (OUTPATIENT)
Age: 34
End: 2024-07-02

## 2024-07-02 NOTE — TELEPHONE ENCOUNTER
Please confirm with patient if she needs another set of labs prior to 07/12 apt she states she had done in April and were normal I do see pending labs for thyroid again but she states she would of made the apt for blood work at check out.

## 2024-07-08 ENCOUNTER — NURSE ONLY (OUTPATIENT)
Age: 34
End: 2024-07-08

## 2024-07-08 DIAGNOSIS — E05.90 HYPERTHYROIDISM: ICD-10-CM

## 2024-07-08 LAB
T4 FREE SERPL-MCNC: 0.9 NG/DL (ref 0.8–1.5)
TSH SERPL DL<=0.05 MIU/L-ACNC: 2.65 UIU/ML (ref 0.36–3.74)

## 2024-07-12 ENCOUNTER — OFFICE VISIT (OUTPATIENT)
Age: 34
End: 2024-07-12
Payer: COMMERCIAL

## 2024-07-12 VITALS
WEIGHT: 206.6 LBS | DIASTOLIC BLOOD PRESSURE: 81 MMHG | TEMPERATURE: 97.6 F | HEART RATE: 70 BPM | HEIGHT: 66 IN | OXYGEN SATURATION: 100 % | BODY MASS INDEX: 33.2 KG/M2 | SYSTOLIC BLOOD PRESSURE: 128 MMHG

## 2024-07-12 DIAGNOSIS — E05.90 HYPERTHYROIDISM: Primary | ICD-10-CM

## 2024-07-12 PROCEDURE — G8417 CALC BMI ABV UP PARAM F/U: HCPCS | Performed by: INTERNAL MEDICINE

## 2024-07-12 PROCEDURE — 1036F TOBACCO NON-USER: CPT | Performed by: INTERNAL MEDICINE

## 2024-07-12 PROCEDURE — G8428 CUR MEDS NOT DOCUMENT: HCPCS | Performed by: INTERNAL MEDICINE

## 2024-07-12 PROCEDURE — 99213 OFFICE O/P EST LOW 20 MIN: CPT | Performed by: INTERNAL MEDICINE

## 2024-07-12 NOTE — PROGRESS NOTES
Follow up     PCP: Keren Thomson MD    Chief Complaint   Patient presents with    Hyperthyroidism     HPI:  Hansa Johnson is a 33 y.o. female with  has a past medical history of Costochondritis, Dyslipidemia, History of deviated nasal septum, and Vitamin D deficiency. Here for follow up of hyperthyroidism.     Today -   On MMI 2.5 mg daily   Feeling well   BRIEF ROS   Gen: no fevers/chills  CV: no chest pain  Resp: no shortness of breath, cough   GI: no nausea, emesis, abdominal pain  Neuro: no confusion     Initial visit  notes 12/2023 -   Recent eval for dizziness revealed low tsh  Prompted endocrine eval   Labs 10/2023.  AST mildly elevated at 38.  AST normal.  Serum creatinine normal.  eGFR 116.  TSH suppressed.  Hemoglobin A1c 5.7%.  History of PE in 2012.  repeat Labs on 10/27/2023 showed TSH of 0.018 with a free T4 of 2.28 with a reference range of 0.82-1.77.  White blood cell count normal.    Anti-thyroid medications: none   Beta blockade: prescribed by cardiologist, did not start taking   No prior thyroid imaging   No weight loss   Heat intolerance/ diaphoresis: No  Occasional palpitations - has holter, seeing cardiology   No chest pain   Fatigue: No   +anxious - started few months ago   Mood: insomnia  Diarrhea/ frequent bowel movements: No   Menstrual cycle: regular   Skin/hair/nail changes: No   Eye symptoms: No   Neck swelling: No   No Dysphagia, voice changes, SOB  Grandmother with overactive thyroid   Exposure to XRT to head and neck or ionizing radiation in childhood:  Recent steroids, immune-therapy/biotin/herbal supplements:  Recent vaccines/URI symptoms  Pregnancy plans: No   Not on birth control, reports no chance she is pregnant  Menses started today     LABS/STUDIES:   No results found for: \"UJK2CGHF\"  Lab Results   Component Value Date/Time    MNOJ1UUJW 5.7 10/25/2023 12:00 AM     No results found for: \"CHOL\", \"TRIG\", \"HDL\"  Lab Results   Component Value Date/Time    TSH 2.65

## 2024-07-12 NOTE — PROGRESS NOTES
Hansa Johnson is a 33 y.o. female here for   Chief Complaint   Patient presents with    Hyperthyroidism       1. Have you been to the ER, urgent care clinic since your last visit?  Hospitalized since your last visit? -NO    2. Have you seen or consulted any other health care providers outside of the [facility] since your last visit?  Include any pap smears or colon screening.-NO

## 2024-09-06 ENCOUNTER — LAB (OUTPATIENT)
Age: 34
End: 2024-09-06

## 2024-09-06 DIAGNOSIS — E05.90 HYPERTHYROIDISM: ICD-10-CM

## 2024-09-06 LAB
T4 FREE SERPL-MCNC: 0.9 NG/DL (ref 0.8–1.5)
TSH SERPL DL<=0.05 MIU/L-ACNC: 1.21 UIU/ML (ref 0.36–3.74)

## 2024-09-18 DIAGNOSIS — E05.90 HYPERTHYROIDISM: Primary | ICD-10-CM

## 2024-10-28 ENCOUNTER — OFFICE VISIT (OUTPATIENT)
Age: 34
End: 2024-10-28
Payer: COMMERCIAL

## 2024-10-28 VITALS
HEART RATE: 68 BPM | OXYGEN SATURATION: 98 % | BODY MASS INDEX: 33.57 KG/M2 | SYSTOLIC BLOOD PRESSURE: 116 MMHG | RESPIRATION RATE: 16 BRPM | WEIGHT: 208.9 LBS | TEMPERATURE: 98.3 F | DIASTOLIC BLOOD PRESSURE: 79 MMHG | HEIGHT: 66 IN

## 2024-10-28 DIAGNOSIS — E66.09 CLASS 1 OBESITY DUE TO EXCESS CALORIES WITH SERIOUS COMORBIDITY AND BODY MASS INDEX (BMI) OF 33.0 TO 33.9 IN ADULT: Primary | ICD-10-CM

## 2024-10-28 DIAGNOSIS — R73.9 BLOOD GLUCOSE ELEVATED: ICD-10-CM

## 2024-10-28 DIAGNOSIS — E78.00 HYPERCHOLESTEROLEMIA: ICD-10-CM

## 2024-10-28 DIAGNOSIS — E66.811 CLASS 1 OBESITY DUE TO EXCESS CALORIES WITH SERIOUS COMORBIDITY AND BODY MASS INDEX (BMI) OF 33.0 TO 33.9 IN ADULT: ICD-10-CM

## 2024-10-28 DIAGNOSIS — E66.811 CLASS 1 OBESITY DUE TO EXCESS CALORIES WITH SERIOUS COMORBIDITY AND BODY MASS INDEX (BMI) OF 33.0 TO 33.9 IN ADULT: Primary | ICD-10-CM

## 2024-10-28 DIAGNOSIS — E05.90 HYPERTHYROIDISM: ICD-10-CM

## 2024-10-28 DIAGNOSIS — E66.09 CLASS 1 OBESITY DUE TO EXCESS CALORIES WITH SERIOUS COMORBIDITY AND BODY MASS INDEX (BMI) OF 33.0 TO 33.9 IN ADULT: ICD-10-CM

## 2024-10-28 LAB
ALBUMIN SERPL-MCNC: 3.9 G/DL (ref 3.5–5)
ALBUMIN/GLOB SERPL: 1 (ref 1.1–2.2)
ALP SERPL-CCNC: 91 U/L (ref 45–117)
ALT SERPL-CCNC: 31 U/L (ref 12–78)
ANION GAP SERPL CALC-SCNC: 6 MMOL/L (ref 2–12)
AST SERPL-CCNC: 18 U/L (ref 15–37)
BILIRUB SERPL-MCNC: 0.3 MG/DL (ref 0.2–1)
BUN SERPL-MCNC: 9 MG/DL (ref 6–20)
BUN/CREAT SERPL: 9 (ref 12–20)
CALCIUM SERPL-MCNC: 8.7 MG/DL (ref 8.5–10.1)
CHLORIDE SERPL-SCNC: 106 MMOL/L (ref 97–108)
CHOLEST SERPL-MCNC: 245 MG/DL
CO2 SERPL-SCNC: 25 MMOL/L (ref 21–32)
CREAT SERPL-MCNC: 0.99 MG/DL (ref 0.55–1.02)
EST. AVERAGE GLUCOSE BLD GHB EST-MCNC: 117 MG/DL
GLOBULIN SER CALC-MCNC: 3.8 G/DL (ref 2–4)
GLUCOSE SERPL-MCNC: 86 MG/DL (ref 65–100)
HBA1C MFR BLD: 5.7 % (ref 4–5.6)
HDLC SERPL-MCNC: 65 MG/DL
HDLC SERPL: 3.8 (ref 0–5)
LDLC SERPL CALC-MCNC: 163.4 MG/DL (ref 0–100)
POTASSIUM SERPL-SCNC: 4.2 MMOL/L (ref 3.5–5.1)
PROT SERPL-MCNC: 7.7 G/DL (ref 6.4–8.2)
SODIUM SERPL-SCNC: 137 MMOL/L (ref 136–145)
TRIGL SERPL-MCNC: 83 MG/DL
VLDLC SERPL CALC-MCNC: 16.6 MG/DL

## 2024-10-28 PROCEDURE — G8427 DOCREV CUR MEDS BY ELIG CLIN: HCPCS | Performed by: FAMILY MEDICINE

## 2024-10-28 PROCEDURE — G8484 FLU IMMUNIZE NO ADMIN: HCPCS | Performed by: FAMILY MEDICINE

## 2024-10-28 PROCEDURE — 1036F TOBACCO NON-USER: CPT | Performed by: FAMILY MEDICINE

## 2024-10-28 PROCEDURE — G8417 CALC BMI ABV UP PARAM F/U: HCPCS | Performed by: FAMILY MEDICINE

## 2024-10-28 PROCEDURE — 99204 OFFICE O/P NEW MOD 45 MIN: CPT | Performed by: FAMILY MEDICINE

## 2024-10-28 RX ORDER — AZELASTINE 1 MG/ML
2 SPRAY, METERED NASAL AS NEEDED
COMMUNITY
Start: 2024-09-25

## 2024-10-28 ASSESSMENT — PATIENT HEALTH QUESTIONNAIRE - PHQ9
SUM OF ALL RESPONSES TO PHQ QUESTIONS 1-9: 0
SUM OF ALL RESPONSES TO PHQ9 QUESTIONS 1 & 2: 0
1. LITTLE INTEREST OR PLEASURE IN DOING THINGS: NOT AT ALL
2. FEELING DOWN, DEPRESSED OR HOPELESS: NOT AT ALL
SUM OF ALL RESPONSES TO PHQ QUESTIONS 1-9: 0

## 2024-10-28 NOTE — PROGRESS NOTES
[facility] Weight Loss Program Progress Note: Initial Physician Visit     Hansa Johnson is a 34 y.o. female who is here for medical screening for entering the [facility] Weight Loss Program.   The patient denies any disease state that requires protein restriction.    CC: class 1 Obesity    Referred by PCP reason referred   getting better control of her weight      Starting weight 208    Weight History  I personally reviewed the Medical Screening [facility] completed by patient and scanned into media section of chart.  It includes duration of their obesity, maximum weight, goal weight  all of which give the context of their obesity AND a Family History of their obesity.    A year after had  her baby, 6 yrs ago, noted weight gain  At [employer] grad was 135 lb   This is her highest ever  Mother is overweight and 3 siblings are        Weight loss History  I personally reviewed the Medical Screening [facility] completed by the patient and scanned into media section of chart.  It includes number of weight loss attempts, the weight loss program that patients was most successful using, and if they have any hx of anorectic medication use, including OTC supplements.     Tried calorie counting but could not stay consistent        Significant Medical History  Past Medical History:   Diagnosis Date    Costochondritis     Dyslipidemia     History of deviated nasal septum     Vitamin D deficiency           Patient's medicactions that may contribute  none  Plan to become pregant within 6 months ,   not sexually    I personally reviewed the Medical Screening [facility] completed by the patient and scanned into media section of chart.  This allows me to assess associated symptoms that are significant in the assessment of the patient's obesity and the patient's Past Medical History.    Current Outpatient Medications   Medication Sig Dispense Refill    azelastine (ASTELIN) 0.1 % nasal spray 2 sprays by Nasal route as

## 2024-10-28 NOTE — PROGRESS NOTES
Identified pt with two pt identifiers (name and ). Reviewed chart in preparation for visit and have obtained necessary documentation.    Hansa Johnson is a 34 y.o. female  Chief Complaint   Patient presents with    New Patient     /79 (Site: Left Upper Arm, Position: Sitting, Cuff Size: Large Adult)   Pulse 68   Temp 98.3 °F (36.8 °C) (Oral)   Resp 16   Ht 1.676 m (5' 6\")   Wt 94.8 kg (208 lb 14.4 oz)   LMP 10/07/2024 (Exact Date)   SpO2 98%   BMI 33.72 kg/m²     1. Have you been to the ER, urgent care clinic since your last visit?  Hospitalized since your last visit?no    2. Have you seen or consulted any other health care providers outside of the [facility] since your last visit?  Include any pap smears or colon screening. no    Patient attended triage but did not bring homework form. Patient instructed to email or fax completed homework form to us. Patient informed that not bringing the homework form can result in not being seen next time.

## 2024-11-07 ENCOUNTER — OFFICE VISIT (OUTPATIENT)
Age: 34
End: 2024-11-07

## 2024-11-07 DIAGNOSIS — E66.01 MORBID OBESITY: Primary | ICD-10-CM

## 2024-11-07 NOTE — PROGRESS NOTES
[facility]  Metabolic Weight Loss Program        Patient's Name: Hansa Johnson  : 1990    This patient is enrolled in [facility] Met[facility] Loss Program and attended the required weekly virtual nutrition class hosted via [employer].      Tiffanie Real RD

## 2024-11-14 ENCOUNTER — OFFICE VISIT (OUTPATIENT)
Age: 34
End: 2024-11-14

## 2024-11-14 DIAGNOSIS — E66.01 MORBID OBESITY: Primary | ICD-10-CM

## 2024-11-15 ENCOUNTER — TELEMEDICINE (OUTPATIENT)
Age: 34
End: 2024-11-15
Payer: COMMERCIAL

## 2024-11-15 ENCOUNTER — PATIENT MESSAGE (OUTPATIENT)
Age: 34
End: 2024-11-15

## 2024-11-15 DIAGNOSIS — E66.09 CLASS 1 OBESITY DUE TO EXCESS CALORIES WITH SERIOUS COMORBIDITY AND BODY MASS INDEX (BMI) OF 33.0 TO 33.9 IN ADULT: Primary | ICD-10-CM

## 2024-11-15 DIAGNOSIS — E66.811 CLASS 1 OBESITY DUE TO EXCESS CALORIES WITH SERIOUS COMORBIDITY AND BODY MASS INDEX (BMI) OF 33.0 TO 33.9 IN ADULT: Primary | ICD-10-CM

## 2024-11-15 DIAGNOSIS — E05.90 HYPERTHYROIDISM: Primary | ICD-10-CM

## 2024-11-15 PROCEDURE — 97802 MEDICAL NUTRITION INDIV IN: CPT | Performed by: DIETITIAN, REGISTERED

## 2024-11-15 NOTE — PROGRESS NOTES
[facility]  Metabolic Weight Loss Program        Patient's Name: Hansa Johnson  : 1990    This patient is a participant at [facility] and attended the weekly virtual nutrition class hosted via [employer].      Sosa Trinidad, MS, RD, LDN

## 2024-11-15 NOTE — PROGRESS NOTES
[facility]  [address]  [place] [address]  Phone: [phone] Fax: [phone]   Nutrition Assessment - Medical Nutrition Therapy   Initial Evaluation         Patient Name: Hansa Johnson : 1990   Treatment Diagnosis: Obesity Class 1   Referral Source: Zuleyma Garcia MD Start of Care (SOC): 11/15/2024     In time:   10:03am             Out time:   10:37am   Total Treatment Time (min):   34 min     Consent:  Patient and/or their healthcare decision maker is aware that this patient-initiated Telehealth encounter is a billable service, with coverage as determined by her insurance carrier. They are aware that they may receive a bill and has provided verbal consent to proceed: yes, confirmed      Hansa Johnson, was evaluated through a synchronous (real-time) audio-video encounter. The patient (or guardian if applicable) is aware that this is a billable service, which includes applicable co-pays. This Virtual Visit was conducted with patient's (and/or legal guardian's) consent. Patient identification was verified, and a caregiver was present when appropriate.   The patient was located at Home: [address] [place] [id number]  Provider was located at Home (not Appt Dept State): [place]  Confirm you are appropriately licensed, registered, or certified to deliver care in the state where the patient is located as indicated above. If you are not or unsure, please re-schedule the visit: Yes, I confirm.      Total time spent for this encounter:  34 min    --AMNA LIM RD on 11/15/2024 at 10:03 AM    An electronic signature was used to authenticate this note.    Gender: female Age: 34 y.o.   Ht: 66 in Wt:  211 lb 95 kg   BMI: 34 RMR   Male  RMR Female 1664   Anthropometrics Assessment: Per BMI, pt is considered obese.      Past Medical History includes: Hyperthyroid, pre-DM, high cholesterol     Pertinent Medications:      Biochemical Data:

## 2024-11-18 ENCOUNTER — HOSPITAL ENCOUNTER (EMERGENCY)
Facility: HOSPITAL | Age: 34
Discharge: HOME | End: 2024-11-18
Attending: EMERGENCY MEDICINE
Payer: MEDICAID

## 2024-11-18 VITALS
WEIGHT: 211 LBS | HEART RATE: 74 BPM | BODY MASS INDEX: 33.91 KG/M2 | SYSTOLIC BLOOD PRESSURE: 111 MMHG | RESPIRATION RATE: 13 BRPM | DIASTOLIC BLOOD PRESSURE: 78 MMHG | HEIGHT: 66 IN | TEMPERATURE: 97.9 F | OXYGEN SATURATION: 100 %

## 2024-11-18 DIAGNOSIS — R00.2 PALPITATIONS: Primary | ICD-10-CM

## 2024-11-18 LAB
ALBUMIN SERPL-MCNC: 4.1 G/DL (ref 3.5–5.2)
ALBUMIN/GLOB SERPL: 1.4 (ref 1.1–2.2)
ALP SERPL-CCNC: 82 U/L (ref 35–104)
ALT SERPL-CCNC: 19 U/L (ref 10–35)
ANION GAP SERPL CALC-SCNC: 14 MMOL/L (ref 2–12)
AST SERPL-CCNC: 21 U/L (ref 10–35)
BASOPHILS # BLD: 0 K/UL (ref 0–1)
BASOPHILS NFR BLD: 1 % (ref 0–1)
BILIRUB SERPL-MCNC: 0.4 MG/DL (ref 0.2–1)
BUN SERPL-MCNC: 10 MG/DL (ref 6–20)
BUN/CREAT SERPL: 11 (ref 12–20)
CALCIUM SERPL-MCNC: 8.9 MG/DL (ref 8.6–10)
CHLORIDE SERPL-SCNC: 102 MMOL/L (ref 98–107)
CO2 SERPL-SCNC: 21 MMOL/L (ref 22–29)
COMMENT:: NORMAL
CREAT SERPL-MCNC: 0.95 MG/DL (ref 0.5–0.9)
DIFFERENTIAL METHOD BLD: ABNORMAL
EKG ATRIAL RATE: 81 BPM
EKG DIAGNOSIS: NORMAL
EKG P AXIS: 64 DEGREES
EKG P-R INTERVAL: 170 MS
EKG Q-T INTERVAL: 354 MS
EKG QRS DURATION: 74 MS
EKG QTC CALCULATION (BAZETT): 411 MS
EKG R AXIS: 23 DEGREES
EKG T AXIS: 49 DEGREES
EKG VENTRICULAR RATE: 81 BPM
EOSINOPHIL # BLD: 0.3 K/UL (ref 0–0.4)
EOSINOPHIL NFR BLD: 6 %
ERYTHROCYTE [DISTWIDTH] IN BLOOD BY AUTOMATED COUNT: 14.6 % (ref 11.5–14.5)
GLOBULIN SER CALC-MCNC: 3 G/DL (ref 2–4)
GLUCOSE SERPL-MCNC: 118 MG/DL (ref 65–100)
HCT VFR BLD AUTO: 35.8 % (ref 35–47)
HGB BLD-MCNC: 11.7 G/DL (ref 11.5–16)
IMM GRANULOCYTES # BLD AUTO: 0 K/UL (ref 0–0.04)
IMM GRANULOCYTES NFR BLD AUTO: 0 % (ref 0–0.5)
LYMPHOCYTES # BLD: 2 K/UL (ref 0.8–3.5)
LYMPHOCYTES NFR BLD: 47 % (ref 12–49)
MAGNESIUM SERPL-MCNC: 1.7 MG/DL (ref 1.6–2.6)
MCH RBC QN AUTO: 26.9 PG (ref 26–34)
MCHC RBC AUTO-ENTMCNC: 32.7 G/DL (ref 30–36.5)
MCV RBC AUTO: 82.3 FL (ref 80–99)
MONOCYTES # BLD: 0.3 K/UL (ref 0–1)
MONOCYTES NFR BLD: 6 % (ref 5–13)
NEUTS SEG # BLD: 1.6 K/UL (ref 1.8–8)
NEUTS SEG NFR BLD: 40 % (ref 32–75)
NRBC # BLD: 0 K/UL (ref 0–0.01)
NRBC BLD-RTO: 0 PER 100 WBC
PLATELET # BLD AUTO: 244 K/UL (ref 150–400)
PMV BLD AUTO: 9.8 FL (ref 8.9–12.9)
POTASSIUM SERPL-SCNC: 3.9 MMOL/L (ref 3.5–5.1)
PROT SERPL-MCNC: 7.1 G/DL (ref 6.4–8.3)
RBC # BLD AUTO: 4.35 M/UL (ref 3.8–5.2)
SODIUM SERPL-SCNC: 137 MMOL/L (ref 136–145)
SPECIMEN HOLD: NORMAL
TSH SERPL DL<=0.05 MIU/L-ACNC: 0.92 UIU/ML (ref 0.27–4.2)
WBC # BLD AUTO: 4.1 K/UL (ref 3.6–11)

## 2024-11-18 PROCEDURE — 93005 ELECTROCARDIOGRAM TRACING: CPT | Performed by: EMERGENCY MEDICINE

## 2024-11-18 PROCEDURE — 93010 ELECTROCARDIOGRAM REPORT: CPT | Performed by: INTERNAL MEDICINE

## 2024-11-18 PROCEDURE — 83735 ASSAY OF MAGNESIUM: CPT

## 2024-11-18 PROCEDURE — 84443 ASSAY THYROID STIM HORMONE: CPT

## 2024-11-18 PROCEDURE — 99284 EMERGENCY DEPT VISIT MOD MDM: CPT

## 2024-11-18 PROCEDURE — 85025 COMPLETE CBC W/AUTO DIFF WBC: CPT

## 2024-11-18 PROCEDURE — 80053 COMPREHEN METABOLIC PANEL: CPT

## 2024-11-18 PROCEDURE — 36415 COLL VENOUS BLD VENIPUNCTURE: CPT

## 2024-11-18 ASSESSMENT — PAIN - FUNCTIONAL ASSESSMENT: PAIN_FUNCTIONAL_ASSESSMENT: NONE - DENIES PAIN

## 2024-11-18 NOTE — ED PROVIDER NOTES
Height Weight         -- --             There is no height or weight on file to calculate BMI.    Physical Exam  Vitals and nursing note reviewed.   Constitutional:       Appearance: Normal appearance.   Cardiovascular:      Rate and Rhythm: Normal rate.   Pulmonary:      Effort: Pulmonary effort is normal. No respiratory distress.   Neurological:      General: No focal deficit present.      Mental Status: She is alert and oriented to person, place, and time.             EMERGENCY DEPARTMENT COURSE and DIFFERENTIAL DIAGNOSIS/MDM:   Vitals:  There were no vitals filed for this visit.      Medical Decision Making  34-year-old female presents to the emergency department above with a chief complaint of palpitations with rapid heart rate on her Apple Watch today.  Workup in the emergency department is reassuring without concerning findings on her EKG or labs.  TSH is acceptable as well.  No electrolyte derangements.  Recommend follow-up with her cardiologist.  Potentially repeat Holter or event monitor could be considered.    Amount and/or Complexity of Data Reviewed  Labs: ordered.  ECG/medicine tests: ordered and independent interpretation performed. Decision-making details documented in ED Course.            REASSESSMENT     ED Course as of 11/18/24 0841   Mon Nov 18, 2024   0841 ED EKG interpretation:  Rhythm: normal sinus rhythm. Rate (approx.): 81.  Axis: normal.  ST segment:  No concerning ST elevations or depressions. This EKG was interpreted by Jesus Portillo MD,ED Provider.   [JM]      ED Course User Index  [JM] Jesus Portillo MD         CONSULTS:  None    PROCEDURES:     Procedures            (Please note that portions of this note were completed with a voice recognition program.  Efforts were made to edit the dictations but occasionally words are mis-transcribed.)    Jesus Portillo MD (electronically signed)  Emergency Attending Physician              Jesus Portillo MD  11/18/24 [id number]

## 2024-11-18 NOTE — ED TRIAGE NOTES
Pt ambulatory into ER with steady gait with cc of intermittent SOB x over 1 week and intermittent medial chest pressure and chest palpitations with elevated HR. Pt reports taking her PRN propanolol PTA around 0800 this AM. Pt reports episode of dizziness PTA.    Pt denies current SOB, CP, dizziness.

## 2024-11-18 NOTE — TELEPHONE ENCOUNTER
Informed pt of Dr. Guzman's note(Thyroid blood test was normal). Pt verbalized understanding with no further questions or concerns at this time.

## 2024-11-19 ENCOUNTER — TELEPHONE (OUTPATIENT)
Age: 34
End: 2024-11-19

## 2024-11-19 NOTE — TELEPHONE ENCOUNTER
Patient is calling because she went to [facility] ER for Palpitations and she would like a call back from the nurse.The ER told her to follow up with the cardiologist.    [phone]

## 2024-11-19 NOTE — TELEPHONE ENCOUNTER
1/31/2024 Merry \"1.  Dizziness/palpitations  -suspect related to newly diagnosed Grave's Disease  - event monitor 12/21/23 - with sinus tachycardia  -echo 12/18/23 LVEF 65%, LV size normal, normal wall thickness   - feeling much better since starting meds for Grave's Disease - denies further issues\"    Spoke to pt,  Thursday at work, had possible panic attack?  Pulse 167 bpm.  Took Propranolol that Dr. Kraft prescribed.    Happened again Monday; was on way to work, felt fast heart rate, SOB.  Sat down, took propranolol; after 30 min, felt better.      If this a panic attack, she doesn't know what the trigger would have been?    Confirmed pt has Propranolol 10 mg PRN.  Has only taken x4 ever.    Has had cold, coughing a lot.  ER checked thyroid, everything is fine.  Asked her to follow vitals at home, let us know if BP/pulse out of range (provided norms).  Discussed possibly repeating monitor?  Encouraged water, electrolytes, avoid caffeine.

## 2024-11-19 NOTE — TELEPHONE ENCOUNTER
Per Dr. Ean Erwin: \"ED - workup OK    Can check a 7 day holter if she wants    See us in a couple of months \"

## 2024-11-20 ENCOUNTER — TELEPHONE (OUTPATIENT)
Age: 34
End: 2024-11-20

## 2024-11-20 ENCOUNTER — PATIENT MESSAGE (OUTPATIENT)
Age: 34
End: 2024-11-20

## 2024-11-20 RX ORDER — PROPRANOLOL HYDROCHLORIDE 10 MG/1
10 TABLET ORAL 2 TIMES DAILY PRN
Qty: 60 TABLET | Refills: 1 | Status: SHIPPED | OUTPATIENT
Start: 2024-11-20

## 2024-11-20 NOTE — TELEPHONE ENCOUNTER
Enrolled with [employer] - Ordered and being shipped to patient's home address on file.  ETA within 5-7 Business Days.        Ean Erwin MD Jarreau, Ashley, LPN15 hours ago (4:45 PM)       ED - workup OK    Can check a 7 day holter if she wants    See us in a couple of months    Thanks

## 2024-12-11 ENCOUNTER — OFFICE VISIT (OUTPATIENT)
Age: 34
End: 2024-12-11
Payer: COMMERCIAL

## 2024-12-11 VITALS
BODY MASS INDEX: 33.25 KG/M2 | WEIGHT: 206.9 LBS | OXYGEN SATURATION: 99 % | SYSTOLIC BLOOD PRESSURE: 108 MMHG | DIASTOLIC BLOOD PRESSURE: 70 MMHG | RESPIRATION RATE: 14 BRPM | HEART RATE: 86 BPM | HEIGHT: 66 IN | TEMPERATURE: 97.9 F

## 2024-12-11 DIAGNOSIS — E66.811 CLASS 1 OBESITY DUE TO EXCESS CALORIES WITH SERIOUS COMORBIDITY AND BODY MASS INDEX (BMI) OF 33.0 TO 33.9 IN ADULT: Primary | ICD-10-CM

## 2024-12-11 DIAGNOSIS — E05.90 HYPERTHYROIDISM: ICD-10-CM

## 2024-12-11 DIAGNOSIS — E78.00 HYPERCHOLESTEROLEMIA: ICD-10-CM

## 2024-12-11 DIAGNOSIS — R73.9 BLOOD GLUCOSE ELEVATED: ICD-10-CM

## 2024-12-11 DIAGNOSIS — E66.09 CLASS 1 OBESITY DUE TO EXCESS CALORIES WITH SERIOUS COMORBIDITY AND BODY MASS INDEX (BMI) OF 33.0 TO 33.9 IN ADULT: Primary | ICD-10-CM

## 2024-12-11 PROCEDURE — 99214 OFFICE O/P EST MOD 30 MIN: CPT | Performed by: FAMILY MEDICINE

## 2024-12-11 RX ORDER — TOPIRAMATE 25 MG/1
25 TABLET, FILM COATED ORAL
Qty: 60 TABLET | Refills: 3 | Status: SHIPPED | OUTPATIENT
Start: 2024-12-11

## 2024-12-11 ASSESSMENT — PATIENT HEALTH QUESTIONNAIRE - PHQ9
SUM OF ALL RESPONSES TO PHQ QUESTIONS 1-9: 0
1. LITTLE INTEREST OR PLEASURE IN DOING THINGS: NOT AT ALL
SUM OF ALL RESPONSES TO PHQ QUESTIONS 1-9: 0
2. FEELING DOWN, DEPRESSED OR HOPELESS: NOT AT ALL
SUM OF ALL RESPONSES TO PHQ9 QUESTIONS 1 & 2: 0

## 2024-12-11 NOTE — PROGRESS NOTES
Identified pt with two pt identifiers (name and ). Reviewed chart in preparation for visit and have obtained necessary documentation.    Hansa Johnson is a 34 y.o. female  Chief Complaint   Patient presents with    Weight Management     [facility]     /70 (Site: Left Upper Arm, Position: Sitting, Cuff Size: Large Adult)   Pulse 86   Temp 97.9 °F (36.6 °C) (Oral)   Resp 14   Ht 1.676 m (5' 6\")   Wt 93.8 kg (206 lb 14.4 oz)   SpO2 99%   BMI 33.39 kg/m²     1. Have you been to the ER, urgent care clinic since your last visit?  Hospitalized since your last visit? Yes- palpitations (in chart)     2. Have you seen or consulted any other health care providers outside of the [facility] since your last visit?  Include any pap smears or colon screening. no

## 2024-12-11 NOTE — PROGRESS NOTES
Weight Loss Progress Note: follow up Physician Visit      Hansa Johnson is a 34 y.o. female  who is here for her follow up  Evaluation for the medical bariatric care.      CC: I want to be healthier  She started as LCD but transitioned to grocery food plan  She has been more constipated lately. Having BM every 2-3 days  Drinking 64 oz water a day and weating veg but not exercising    Weight History  Current weight 206( 208) and BMI is Body mass index is 33.39 kg/m².  Goal weight BMI 29  Highest weight 208   (See weight gain time line scanned into media section of chart)    Hunger control: good    Significant Medical History    Update on sleep apnea and  CPAP no  5-6    Ever had bariatric surgery: no    Pregnant or planning on becoming pregnant w/in 6 months: no         Significant Psychosocial History     Current Major Lifestyle Changes: no  Any potential unsupportive people: no          Social History  Social History     Tobacco Use    Smoking status: Never    Smokeless tobacco: Never   Substance Use Topics    Alcohol use: Not Currently     How many times a week do you eat out?  0    Do you drink any EtOH?  no   If so, how much?        Do you have upcoming any travel in the next 6 weeks?  no   If so, what do you have planned?          Exercise  How many days a week do you currently exercise?  Not consistent days  Have you ever been told by a physician not to exercise?  no      Objective  /70 (Site: Left Upper Arm, Position: Sitting, Cuff Size: Large Adult)   Pulse 86   Temp 97.9 °F (36.6 °C) (Oral)   Resp 14   Ht 1.676 m (5' 6\")   Wt 93.8 kg (206 lb 14.4 oz)   SpO2 99%   BMI 33.39 kg/m²   Current Outpatient Medications   Medication Sig Dispense Refill    topiramate (TOPAMAX) 25 MG tablet Take 1 tablet by mouth Daily with lunch 60 tablet 3    propranolol (INDERAL) 10 MG tablet Take 1 tablet by mouth 2 times daily as needed (tachycardia) 60 tablet 1    azelastine (ASTELIN) 0.1 % nasal spray 2 sprays by

## 2025-01-24 RX ORDER — PROPRANOLOL HYDROCHLORIDE 10 MG/1
TABLET ORAL
Qty: 60 TABLET | Refills: 1 | Status: SHIPPED | OUTPATIENT
Start: 2025-01-24

## 2025-01-28 ENCOUNTER — TELEPHONE (OUTPATIENT)
Age: 35
End: 2025-01-28

## 2025-01-28 NOTE — TELEPHONE ENCOUNTER
Called patient in regards to an appointment reminder. Patient did not answer, left voicemail instructing patient to call back.

## 2025-01-29 ENCOUNTER — TELEPHONE (OUTPATIENT)
Age: 35
End: 2025-01-29

## 2025-01-29 NOTE — TELEPHONE ENCOUNTER
Patient left voicemail requesting to cancel appointment due to family emergency/ out of town; canceled appointment

## 2025-02-07 ENCOUNTER — OFFICE VISIT (OUTPATIENT)
Age: 35
End: 2025-02-07
Payer: COMMERCIAL

## 2025-02-07 VITALS
HEART RATE: 78 BPM | SYSTOLIC BLOOD PRESSURE: 109 MMHG | WEIGHT: 209 LBS | HEIGHT: 66 IN | DIASTOLIC BLOOD PRESSURE: 72 MMHG | TEMPERATURE: 97.7 F | RESPIRATION RATE: 16 BRPM | OXYGEN SATURATION: 100 % | BODY MASS INDEX: 33.59 KG/M2

## 2025-02-07 DIAGNOSIS — E05.90 HYPERTHYROIDISM: Primary | ICD-10-CM

## 2025-02-07 PROCEDURE — 99213 OFFICE O/P EST LOW 20 MIN: CPT | Performed by: INTERNAL MEDICINE

## 2025-02-07 NOTE — PROGRESS NOTES
Follow up     PCP: Keren Thomson MD    Chief Complaint   Patient presents with    Thyroid Problem     HPI:  Hansa Johnson is a 34 y.o. female with  has a past medical history of Costochondritis, Dyslipidemia, History of deviated nasal septum, and Vitamin D deficiency. Here for follow up of hyperthyroidism.     Today -   Off MMI since 7/2024   Feeling well   BRIEF ROS   Gen: no fevers/chills  CV: no chest pain  Resp: no shortness of breath, cough   GI: no nausea, emesis, abdominal pain  Neuro: no confusion     Initial visit  notes 12/2023 -   Recent eval for dizziness revealed low tsh  Prompted endocrine eval   Labs 10/2023.  AST mildly elevated at 38.  AST normal.  Serum creatinine normal.  eGFR 116.  TSH suppressed.  Hemoglobin A1c 5.7%.  History of PE in 2012.  repeat Labs on 10/27/2023 showed TSH of 0.018 with a free T4 of 2.28 with a reference range of 0.82-1.77.  White blood cell count normal.    Anti-thyroid medications: none   Beta blockade: prescribed by cardiologist, did not start taking   No prior thyroid imaging   No weight loss   Heat intolerance/ diaphoresis: No  Occasional palpitations - has holter, seeing cardiology   No chest pain   Fatigue: No   +anxious - started few months ago   Mood: insomnia  Diarrhea/ frequent bowel movements: No   Menstrual cycle: regular   Skin/hair/nail changes: No   Eye symptoms: No   Neck swelling: No   No Dysphagia, voice changes, SOB  Grandmother with overactive thyroid   Exposure to XRT to head and neck or ionizing radiation in childhood:  Recent steroids, immune-therapy/biotin/herbal supplements:  Recent vaccines/URI symptoms  Pregnancy plans: No   Not on birth control, reports no chance she is pregnant  Menses started today     LABS/STUDIES:   No results found for: \"WGQ1LHLP\"  Lab Results   Component Value Date/Time    LABA1C 5.7 10/28/2024 12:04 PM    XFQG6YWJZ 5.7 10/25/2023 12:00 AM    CREATININE 0.95 11/18/2024 08:52 AM    LABGLOM 80 11/18/2024 08:52

## 2025-02-07 NOTE — PROGRESS NOTES
Hansa Johnson is a 34 y.o. female here for   Chief Complaint   Patient presents with    Thyroid Problem       1. Have you been to the ER, urgent care clinic since your last visit?  Hospitalized since your last visit? -  11/18/24: [facility]- Palpitations     2. Have you seen or consulted any other health care providers outside of the [facility] since your last visit?  Include any pap smears or colon screening.- PCP

## 2025-03-07 ENCOUNTER — TELEPHONE (OUTPATIENT)
Age: 35
End: 2025-03-07

## 2025-03-26 RX ORDER — PROPRANOLOL HYDROCHLORIDE 10 MG/1
TABLET ORAL
Qty: 60 TABLET | Refills: 2 | Status: SHIPPED | OUTPATIENT
Start: 2025-03-26

## 2025-04-29 ENCOUNTER — PATIENT MESSAGE (OUTPATIENT)
Age: 35
End: 2025-04-29

## 2025-04-29 DIAGNOSIS — E05.90 HYPERTHYROIDISM: Primary | ICD-10-CM

## 2025-05-01 ENCOUNTER — LAB (OUTPATIENT)
Age: 35
End: 2025-05-01

## 2025-05-01 DIAGNOSIS — E05.90 HYPERTHYROIDISM: ICD-10-CM

## 2025-05-01 LAB — TSH SERPL DL<=0.05 MIU/L-ACNC: 0.71 UIU/ML (ref 0.36–3.74)

## 2025-05-02 ENCOUNTER — RESULTS FOLLOW-UP (OUTPATIENT)
Age: 35
End: 2025-05-02

## 2025-05-02 LAB — TSI ACT/NOR SER: 6.87 IU/L (ref 0–0.55)

## 2025-06-16 ENCOUNTER — OFFICE VISIT (OUTPATIENT)
Age: 35
End: 2025-06-16
Payer: COMMERCIAL

## 2025-06-16 VITALS
DIASTOLIC BLOOD PRESSURE: 72 MMHG | OXYGEN SATURATION: 98 % | SYSTOLIC BLOOD PRESSURE: 116 MMHG | HEART RATE: 74 BPM | BODY MASS INDEX: 35.71 KG/M2 | WEIGHT: 222.2 LBS | HEIGHT: 66 IN

## 2025-06-16 DIAGNOSIS — E78.5 DYSLIPIDEMIA: ICD-10-CM

## 2025-06-16 DIAGNOSIS — R00.2 PALPITATION: Primary | ICD-10-CM

## 2025-06-16 DIAGNOSIS — E05.90 HYPERTHYROIDISM: ICD-10-CM

## 2025-06-16 PROCEDURE — 99213 OFFICE O/P EST LOW 20 MIN: CPT | Performed by: SPECIALIST

## 2025-06-16 NOTE — PROGRESS NOTES
Chief Complaint   Patient presents with    Palpitations     hyperthyroidism     Vitals:    06/16/25 1522   BP: 116/72   BP Site: Left Upper Arm   Patient Position: Sitting   Pulse: 74   SpO2: 98%   Weight: 100.8 kg (222 lb 3.2 oz)   Height: 1.676 m (5' 6\")     Chest pain: DENIED     Recent hospital stays: DENIED     Refills: DENIED

## 2025-06-16 NOTE — PATIENT INSTRUCTIONS
Patient Education        Learning About the Mediterranean Diet  What is the Mediterranean diet?     The Mediterranean diet is a style of eating rather than a diet plan. It features foods eaten in [place], [place], southern [place], and other countries along the [place] Sea. It emphasizes eating foods like fish, fruits, vegetables, beans, high-fiber breads and whole grains, nuts, and olive oil. This style of eating includes limited red meat, cheese, and sweets.  Why choose the Mediterranean diet?  A Mediterranean-style diet may improve heart health. It contains more fat than other heart-healthy diets. But the fats are mainly from nuts, unsaturated oils (such as fish oils and olive oil), and certain nut or seed oils (such as canola, soybean, or flaxseed oil). These fats may help protect the heart and blood vessels.  How can you get started on the Mediterranean diet?  Here are some things you can do to switch to a more Mediterranean way of eating.  What to eat  Eat a variety of fruits and vegetables each day, such as grapes, blueberries, tomatoes, broccoli, peppers, figs, olives, spinach, eggplant, beans, lentils, and chickpeas.  Eat a variety of whole-grain foods each day, such as oats, brown rice, and whole wheat bread, pasta, and couscous.  Eat fish at least 2 times a week. Try tuna, salmon, mackerel, lake trout, herring, or sardines.  Eat moderate amounts of low-fat dairy products, such as milk, cheese, or yogurt.  Eat moderate amounts of poultry and eggs.  Choose healthy (unsaturated) fats, such as nuts, olive oil, and certain nut or seed oils like canola, soybean, and flaxseed.  Limit unhealthy (saturated) fats, such as butter, palm oil, and coconut oil. And limit fats found in animal products, such as meat and dairy products made with whole milk. Try to eat red meat only a few times a month in very small amounts.  Limit sweets and desserts to only a few times a week. This includes sugar-sweetened

## 2025-06-16 NOTE — PROGRESS NOTES
Ean Erwin MD. [facility]          Patient: Hansa Johnson  : 1990      Today's Date: 2025        HISTORY OF PRESENT ILLNESS:     History of Present Illness:    Initially seen for Graves disease - had palpitations then.   Doing fine now.  No sig cardiac complaints.        PAST MEDICAL HISTORY:     Past Medical History:   Diagnosis Date    Costochondritis     Dyslipidemia     Graves' disease     History of deviated nasal septum     Vitamin D deficiency            CURRENT MEDICATIONS:    .  Current Outpatient Medications   Medication Sig Dispense Refill    propranolol (INDERAL) 10 MG tablet TAKE 1 TABLET BY MOUTH TWICE DAILY AS NEEDED FOR FAST HEART RATE 60 tablet 2     No current facility-administered medications for this visit.       Allergies   Allergen Reactions    Prednisone          SOCIAL HISTORY:     Social History     Tobacco Use    Smoking status: Never    Smokeless tobacco: Never   Vaping Use    Vaping status: Never Used   Substance Use Topics    Alcohol use: Never    Drug use: Never         FAMILY HISTORY:     Family History   Problem Relation Age of Onset    Diabetes Mother     High Blood Pressure Mother     Obesity Mother     Hypertension Father     Diabetes Father     Hypertension Maternal Aunt     Diabetes Maternal Grandmother     Heart Disease Maternal Grandmother     Hyperthyroidism Maternal Grandmother     Diabetes type 2  Maternal Grandmother     Diabetes Maternal Grandfather     Diabetes type 2  Maternal Grandfather     High Blood Pressure Brother     Obesity Sister     Obesity Brother          REVIEW OF SYMPTOMS:     Review of Symptoms:  Constitutional: Negative for fever, chills  HEENT: Negative for nosebleeds, tinnitus, and vision changes.   Respiratory: Negative for cough, wheezing  Cardiovascular: Negative for orthopnea, claudication, syncope  Gastrointestinal: Negative for abdominal pain, diarrhea, melena.   Genitourinary: Negative for dysuria  Musculoskeletal: Negative for